# Patient Record
Sex: MALE | Race: WHITE
[De-identification: names, ages, dates, MRNs, and addresses within clinical notes are randomized per-mention and may not be internally consistent; named-entity substitution may affect disease eponyms.]

---

## 2019-09-29 ENCOUNTER — HEALTH MAINTENANCE LETTER (OUTPATIENT)
Age: 64
End: 2019-09-29

## 2020-03-15 ENCOUNTER — HEALTH MAINTENANCE LETTER (OUTPATIENT)
Age: 65
End: 2020-03-15

## 2021-01-14 ENCOUNTER — HEALTH MAINTENANCE LETTER (OUTPATIENT)
Age: 66
End: 2021-01-14

## 2021-04-13 ENCOUNTER — TRANSFERRED RECORDS (OUTPATIENT)
Dept: HEALTH INFORMATION MANAGEMENT | Facility: CLINIC | Age: 66
End: 2021-04-13

## 2021-04-23 ENCOUNTER — TRANSCRIBE ORDERS (OUTPATIENT)
Dept: OTHER | Age: 66
End: 2021-04-23

## 2021-04-23 DIAGNOSIS — D09.9 SQUAMOUS CELL CARCINOMA IN SITU: Primary | ICD-10-CM

## 2021-04-30 NOTE — TELEPHONE ENCOUNTER
FUTURE VISIT INFORMATION      FUTURE VISIT INFORMATION:    Date: 5.3.21    Time: 8:30    Location: [facility]  REFERRAL INFORMATION:    Referring provider:  Dr. Cyndee Caban    Referring providers clinic:  [facility]    Reason for visit/diagnosis  SCC, Scalp     RECORDS REQUESTED FROM:       Clinic name Comments Records Status Imaging Status   [facility] 4.13.21  Dr. Caban  Path # [id number] CE Received  Sent to scanning

## 2021-05-03 ENCOUNTER — PRE VISIT (OUTPATIENT)
Dept: DERMATOLOGY | Facility: CLINIC | Age: 66
End: 2021-05-03

## 2021-05-03 ENCOUNTER — OFFICE VISIT (OUTPATIENT)
Dept: DERMATOLOGY | Facility: CLINIC | Age: 66
End: 2021-05-03
Payer: MEDICARE

## 2021-05-03 VITALS — HEART RATE: 82 BPM | SYSTOLIC BLOOD PRESSURE: 141 MMHG | DIASTOLIC BLOOD PRESSURE: 78 MMHG

## 2021-05-03 DIAGNOSIS — D04.4 SQUAMOUS CELL CARCINOMA IN SITU (SCCIS) OF SCALP: Primary | ICD-10-CM

## 2021-05-03 DIAGNOSIS — D04.39 SQUAMOUS CELL CARCINOMA IN SITU (SCCIS) OF SKIN OF RIGHT TEMPLE REGION: ICD-10-CM

## 2021-05-03 PROCEDURE — 13121 CMPLX RPR S/A/L 2.6-7.5 CM: CPT | Mod: GC | Performed by: DERMATOLOGY

## 2021-05-03 PROCEDURE — 17311 MOHS 1 STAGE H/N/HF/G: CPT | Mod: GC | Performed by: DERMATOLOGY

## 2021-05-03 PROCEDURE — 17312 MOHS ADDL STAGE: CPT | Mod: GC | Performed by: DERMATOLOGY

## 2021-05-03 PROCEDURE — 13122 CMPLX RPR S/A/L ADDL 5 CM/>: CPT | Mod: GC | Performed by: DERMATOLOGY

## 2021-05-03 ASSESSMENT — PAIN SCALES - GENERAL: PAINLEVEL: NO PAIN (0)

## 2021-05-03 NOTE — LETTER
5/3/2021       RE: Robert Abbott  [address]  [address]  [facility] [address]     Dear Colleague,    Thank you for referring your patient, Robert Abbott, to the [facility] DERMATOLOGIC SURGERY [facility] [place] at [facility]. Please see a copy of my visit note below.    [facility] [place] Procedure Note    Date of Service:  May 3, 2021  Surgery: Mohs micrographic surgery    Case 1  Repair Type: Complex linear   Repair Size: 5.2 cm  Suture Material: 3-0 Vicryl, 4-0 Monocryl and 5-0 Fast absorbing gut  Tumor Type: SCCis  Location: Vertex scalp  Derm-Path Accession #: [id number]   PreOp Size: 1.7 x 1.2 cm  PostOp Size: 2.8 x 2.0 cm  Mohs Accession #:   Level of Defect: Fat      Procedure:  We discussed the principles of treatment and most likely complications including scarring, bleeding, infection, swelling, pain, crusting, nerve damage, large wound,  incomplete excision, wound dehiscence,  nerve damage, recurrence, and a second procedure may be recommended to obtain the best cosmetic or functional result.    Informed consent was obtained and the patient underwent the procedure as follows:  The patient was placed supine on the operating table.  The cancer was identified, outlined with a marker, and verified by the patient.  The entire surgical field was prepped with chlorhexidine.  The surgical site was anesthetized using 1% lidocaine with epinephrine.    The area of clinically apparent tumor was debulked. The layer of tissue was then surgically excised using a #15 blade and was then transferred onto a specimen sheet maintaining the orientation of the specimen. Hemostasis was obtained using electrocoagulation. The wound site was then covered with a dressing while the tissue samples were processed for examination.    The excised tissue was transported to the Mohs histology laboratory maintaining the tissue  orientation.  The tissue specimen was relaxed so that the entire surgical margin was in a a single horizontal plane for sectioning and inked for precise mapping.  A precise reference map was drawn to reflect the sectioning of the specimen, colored inking of the margins, and orientation on the patient.  The tissue was processed using horizontal sectioning of the base and continuous peripheral margins.  The histopathologic sections were reviewed in conjunction with the reference map.    Total blocks: 2    Total slides:  4    Residual tumor was identified and indicated in red on the reference map, identifying the location where further tissue excision was necessary. Therefore, an additional stage of Mohs Micrographic surgery was deemed necessary.     Stage II   The patient was returned to the operating room, and the area prepped in the usual manner. The residual tumor was excised using the reference map as a guide. The specimen was transfered to a labeled specimen sheet maintaining the orientation of the specimen. Hemostasis was obtained and the wound site was covered with a dressing while the tissue was processed for examination.     The excised tissue was transported to the Mohs histology laboratory maintaining orientation. The specimen margins were inked for precise mapping and a reference map was prepared for the is additional stage to maintain precise orientation as described above. The tissue was processed using horizontal sectioning of the base and continuous peripheral margins. The histopathologic sections were reviewed in conjunction with the reference map.     Total blocks: 1    Total slides:  2    There were no cancer cells visualized on examination, therefore Mohs surgery was complete.     REPAIR:     A complex layered linear closure was selected as the procedure which would maximally preserve both function and cosmesis and for the following reasons: 1) the defect was widely undermined and 2) given the  wound size, depth, tension, and location.     After the excision of the tumor, the area was extensively and carefully undermined using blunt Metzenbaum scissors. Hemostasis was obtained with spot electrocautery and ligation of vessels where necessary. The subcutaneous and dermal layers were then closed with several 3-0 Vicryl and 4-0 Monocryl sutures. The epidermis was then carefully approximated along the length of the wound using 5-0 Fast absorbing gut simple running sutures.     Estimated blood loss was less than 10 ml for all surgical sites. A sterile pressure dressing was applied and wound care instructions, with a written handout, were given. The patient was discharged from the [facility]atologic Surgery Center alert and ambulatory.    Dr. Zelaya performed the micrographic surgery and reconstruction under the direct supervision of Dr. Larson, who was present for the entire micrographic surgery and key portions of the reconstruction, and always immediately available.         [facility]at[facility] [place] Procedure Note      Date of Service:  May 3, 2021  Surgery: Mohs micrographic surgery    Case 2  Repair Type: complex linear  Repair Size: 5.0 cm  Suture Material: 4-0 Monocryl and 5-0 Fast absorbing gut  Tumor Type: SCCis  Location: Right temple  Derm-Path Accession #:   PreOp Size: 1.5 x 1.3 cm  PostOp Size: 3.2 x 2.4 cm  Mohs Accession #:   Level of Defect: Fat      Procedure:  We discussed the principles of treatment and most likely complications including scarring, bleeding, infection, swelling, pain, crusting, nerve damage, large wound,  incomplete excision, wound dehiscence,  nerve damage, recurrence, and a second procedure may be recommended to obtain the best cosmetic or functional result.    Informed consent was obtained and the patient underwent the procedure as follows:  The patient was placed supine on the operating table.  The cancer was identified, outlined with a  marker, and verified by the patient.  The entire surgical field was prepped with chlorhexidine.  The surgical site was anesthetized using 1% lidocaine with epinephrine.    The area of clinically apparent tumor was debulked. The layer of tissue was then surgically excised using a #15 blade and was then transferred onto a specimen sheet maintaining the orientation of the specimen. Hemostasis was obtained using electrocoagulation. The wound site was then covered with a dressing while the tissue samples were processed for examination.    The excised tissue was transported to the [facility] histology laboratory maintaining the tissue orientation.  The tissue specimen was relaxed so that the entire surgical margin was in a a single horizontal plane for sectioning and inked for precise mapping.  A precise reference map was drawn to reflect the sectioning of the specimen, colored inking of the margins, and orientation on the patient.  The tissue was processed using horizontal sectioning of the base and continuous peripheral margins.  The histopathologic sections were reviewed in conjunction with the reference map.    Total blocks: 2    Total slides:  6    There were no cancer cells visualized on examination, therefore Mohs surgery was complete.    REPAIR:     A complex layered linear closure was selected as the procedure which would maximally preserve both function and cosmesis and for the following reasons: 1) the defect was widely undermined and 2) given the wound size, depth, tension, and location.     After the excision of the tumor, the area was extensively and carefully undermined using blunt Metzenbaum scissors. Hemostasis was obtained with spot electrocautery and ligation of vessels where necessary. The subcutaneous and dermal layers were then closed with 4-0 Monocryl sutures. The epidermis was then carefully approximated along the length of the wound using 5-0 Fast absorbing gut simple running sutures.     Estimated  blood loss was less than 10 ml for all surgical sites. A sterile pressure dressing was applied and wound care instructions, with a written handout, were given. The patient was discharged from the Dermatologic Surgery Center alert and ambulatory.    Dr. Zelaya performed the micrographic surgery and reconstruction under the direct supervision of Dr. Larson, who was present for the entire micrographic surgery and key portions of the reconstruction, and always immediately available.     Ga Zelaya MD  PGY-6    Micrographic Surgery and Dermatologic Oncology Fellow  May 3, 2021          Attestation signed by Kirt Larson MD at 5/12/2021  9:53 AM:    Attending attestation:  I was present for key elements of the procedure and immediately available for all other portions of the procedure.  I have reviewed the note and edited it as necessary.    Kirt Larson M.D.  Professor  Director of Dermatologic Surgery  Department of Dermatology  [facility]    Dermatology Surgery Clinic  [facility]  [address] [place] [id number]

## 2021-05-03 NOTE — PROGRESS NOTES
[facility] Dermatologic Surgery Clinic [place] Procedure Note    Date of Service:  May 3, 2021  Surgery: Mohs micrographic surgery    Case 1  Repair Type: Complex linear   Repair Size: 5.2 cm  Suture Material: 3-0 Vicryl, 4-0 Monocryl and 5-0 Fast absorbing gut  Tumor Type: SCCis  Location: Vertex scalp  Derm-Path Accession #: [id number]   PreOp Size: 1.7 x 1.2 cm  PostOp Size: 2.8 x 2.0 cm  Mohs Accession #:   Level of Defect: Fat      Procedure:  We discussed the principles of treatment and most likely complications including scarring, bleeding, infection, swelling, pain, crusting, nerve damage, large wound,  incomplete excision, wound dehiscence,  nerve damage, recurrence, and a second procedure may be recommended to obtain the best cosmetic or functional result.    Informed consent was obtained and the patient underwent the procedure as follows:  The patient was placed supine on the operating table.  The cancer was identified, outlined with a marker, and verified by the patient.  The entire surgical field was prepped with chlorhexidine.  The surgical site was anesthetized using 1% lidocaine with epinephrine.    The area of clinically apparent tumor was debulked. The layer of tissue was then surgically excised using a #15 blade and was then transferred onto a specimen sheet maintaining the orientation of the specimen. Hemostasis was obtained using electrocoagulation. The wound site was then covered with a dressing while the tissue samples were processed for examination.    The excised tissue was transported to the Mohs histology laboratory maintaining the tissue orientation.  The tissue specimen was relaxed so that the entire surgical margin was in a a single horizontal plane for sectioning and inked for precise mapping.  A precise reference map was drawn to reflect the sectioning of the specimen, colored inking of the margins, and orientation on the patient.  The tissue was processed using  horizontal sectioning of the base and continuous peripheral margins.  The histopathologic sections were reviewed in conjunction with the reference map.    Total blocks: 2    Total slides:  4    Residual tumor was identified and indicated in red on the reference map, identifying the location where further tissue excision was necessary. Therefore, an additional stage of Mohs Micrographic surgery was deemed necessary.     Stage II   The patient was returned to the operating room, and the area prepped in the usual manner. The residual tumor was excised using the reference map as a guide. The specimen was transfered to a labeled specimen sheet maintaining the orientation of the specimen. Hemostasis was obtained and the wound site was covered with a dressing while the tissue was processed for examination.     The excised tissue was transported to the Mohs histology laboratory maintaining orientation. The specimen margins were inked for precise mapping and a reference map was prepared for the is additional stage to maintain precise orientation as described above. The tissue was processed using horizontal sectioning of the base and continuous peripheral margins. The histopathologic sections were reviewed in conjunction with the reference map.     Total blocks: 1    Total slides:  2    There were no cancer cells visualized on examination, therefore Mohs surgery was complete.     REPAIR:     A complex layered linear closure was selected as the procedure which would maximally preserve both function and cosmesis and for the following reasons: 1) the defect was widely undermined and 2) given the wound size, depth, tension, and location.     After the excision of the tumor, the area was extensively and carefully undermined using blunt Metzenbaum scissors. Hemostasis was obtained with spot electrocautery and ligation of vessels where necessary. The subcutaneous and dermal layers were then closed with several 3-0 Vicryl and 4-0  Monocryl sutures. The epidermis was then carefully approximated along the length of the wound using 5-0 Fast absorbing gut simple running sutures.     Estimated blood loss was less than 10 ml for all surgical sites. A sterile pressure dressing was applied and wound care instructions, with a written handout, were given. The patient was discharged from the Dermatologic Surgery Center alert and ambulatory.    Dr. Zelaya performed the micrographic surgery and reconstruction under the direct supervision of Dr. Larson, who was present for the entire micrographic surgery and key portions of the reconstruction, and always immediately available.         [facility] Dermatologic Surgery [facility] [place] Procedure Note      Date of Service:  May 3, 2021  Surgery: Mohs micrographic surgery    Case 2  Repair Type: complex linear  Repair Size: 5.0 cm  Suture Material: 4-0 Monocryl and 5-0 Fast absorbing gut  Tumor Type: SCCis  Location: Right temple  Derm-Path Accession #:   PreOp Size: 1.5 x 1.3 cm  PostOp Size: 3.2 x 2.4 cm  Mohs Accession #:   Level of Defect: Fat      Procedure:  We discussed the principles of treatment and most likely complications including scarring, bleeding, infection, swelling, pain, crusting, nerve damage, large wound,  incomplete excision, wound dehiscence,  nerve damage, recurrence, and a second procedure may be recommended to obtain the best cosmetic or functional result.    Informed consent was obtained and the patient underwent the procedure as follows:  The patient was placed supine on the operating table.  The cancer was identified, outlined with a marker, and verified by the patient.  The entire surgical field was prepped with chlorhexidine.  The surgical site was anesthetized using 1% lidocaine with epinephrine.    The area of clinically apparent tumor was debulked. The layer of tissue was then surgically excised using a #15 blade and was then transferred onto a specimen  sheet maintaining the orientation of the specimen. Hemostasis was obtained using electrocoagulation. The wound site was then covered with a dressing while the tissue samples were processed for examination.    The excised tissue was transported to the Mohs histology laboratory maintaining the tissue orientation.  The tissue specimen was relaxed so that the entire surgical margin was in a a single horizontal plane for sectioning and inked for precise mapping.  A precise reference map was drawn to reflect the sectioning of the specimen, colored inking of the margins, and orientation on the patient.  The tissue was processed using horizontal sectioning of the base and continuous peripheral margins.  The histopathologic sections were reviewed in conjunction with the reference map.    Total blocks: 2    Total slides:  6    There were no cancer cells visualized on examination, therefore Mohs surgery was complete.    REPAIR:     A complex layered linear closure was selected as the procedure which would maximally preserve both function and cosmesis and for the following reasons: 1) the defect was widely undermined and 2) given the wound size, depth, tension, and location.     After the excision of the tumor, the area was extensively and carefully undermined using blunt Metzenbaum scissors. Hemostasis was obtained with spot electrocautery and ligation of vessels where necessary. The subcutaneous and dermal layers were then closed with 4-0 Monocryl sutures. The epidermis was then carefully approximated along the length of the wound using 5-0 Fast absorbing gut simple running sutures.     Estimated blood loss was less than 10 ml for all surgical sites. A sterile pressure dressing was applied and wound care instructions, with a written handout, were given. The patient was discharged from the Dermatologic Surgery Center alert and ambulatory.    Dr. Zelaya performed the micrographic surgery and reconstruction under the direct  supervision of Dr. Larson, who was present for the entire micrographic surgery and key portions of the reconstruction, and always immediately available.     Ga Zelaya MD  PGY-6    Micrographic Surgery and Dermatologic Oncology Fellow  May 3, 2021

## 2021-05-03 NOTE — PATIENT INSTRUCTIONS
Wound Care Instructions  I will experience scar, altered skin color, bleeding, swelling, pain, crusting and redness. I may experience altered sensation. Risks are excessive bleeding, infection, muscle weakness, thick (hypertrophic or keloidal) scar, and recurrence,. A second procedure may be recommended to obtain the best cosmetic or functional result.  Possible complications of any surgical procedure are bleeding, infection, scarring, alteration in skin color and sensation, muscle weakness in the area, wound dehiscence or seperation, or recurrence of the lesion or disease. On occasion, after healing, a secondary procedure or revision may be recommended in order to obtain the best cosmetic or functional result.   After your surgery, a pressure bandage will be placed over the area that has sutures. This will help prevent bleeding.   For the First 48 hours After Surgery:  1. Leave the pressure bandage on and keep it dry. If it should come loose, you may retape it, but do not take it off.  2. Relax and take it easy. Do not do any vigorous exercise, heavy lifting, or bending forward. This could cause the wound to bleed.  3. Post-operative pain is usually mild. You may take plain or extra strength Tylenol every 4 hours as needed (do not take more than 4,000mg in one day). Do not take any medicine that contains aspirin, ibuprofen or motrin unless you have been recommended these by a doctor.  Avoid alcohol and vitamin E as these may increase your tendency to bleed.  4. You may put an ice pack around the bandaged area for 20 minutes every 2-3 hours. This may help reduce swelling, bruising, and pain. Make sure the ice pack is waterproof so that the pressure bandage does not get wet.   5. You may see a small amount of drainage or blood on your pressure bandage. This is normal. However, if drainage or bleeding continues or saturates the bandage, you will need to apply firm pressure over the bandage with a washcloth for 15  minutes. If bleeding continues after applying pressure for 15 minutes then go to the nearest emergency room.  48 Hours After Surgery  Carefully remove the bandage and start daily wound care and dressing changes. You may also now shower and get the wound wet. Wash wound with a mild soap and water.  Use caution when washing the wound. Be gentle and do not let the forceful shower stream hit the wound directly.  PAT dry.  Daily Wound Care:  1. Wash wound with a mild soap and water.  Use caution when washing the wound, be gentle and do not let the forceful shower stream hit the wound directly.  2. PAT DRY.  3. Apply Vaseline (from a new container or tube) over the suture line with a Q-tip. It is very important to keep the wound continuously moist, as wounds heal best in a moist environment.  4.  Keep the site covered until sutures are removed, you can cover it with a Telfa (non-stick) dressing and tape or a band-aid.    5. If you are unable to keep wound covered, you must apply Vaseline every 2 - 3 hours (while awake) to ensure it is being kept moist for optimal healing. A dressing overnight is recommended to keep the area moist.   Call Us If:  1. You have pain that is not controlled with Tylenol.  2. You have signs or symptoms of an infection, such as: fever over 100 degrees F, redness, warmth, or foul-smelling or yellow/creamy drainage from the wound.  Who should I call with questions?    [facility]: [phone]     [facility]: [phone]    For urgent needs outside of business hours call the [facility] at [phone] and ask for the dermatology resident on call

## 2021-05-09 ENCOUNTER — HEALTH MAINTENANCE LETTER (OUTPATIENT)
Age: 66
End: 2021-05-09

## 2021-05-10 ENCOUNTER — VIRTUAL VISIT (OUTPATIENT)
Dept: DERMATOLOGY | Facility: CLINIC | Age: 66
End: 2021-05-10
Payer: MEDICARE

## 2021-05-10 DIAGNOSIS — Z53.9 ERRONEOUS ENCOUNTER--DISREGARD: Primary | ICD-10-CM

## 2021-05-10 ASSESSMENT — PAIN SCALES - GENERAL: PAINLEVEL: NO PAIN (0)

## 2021-05-10 NOTE — LETTER
Date:May 15, 2021      Patient was self referred, no letter generated. Do not send.        [facility] Health Information

## 2021-05-10 NOTE — NURSING NOTE
Dermatology Rooming Note    Robert Abbott's goals for this visit include:   Chief Complaint   Patient presents with     Derm Problem     Squamous cell carcinoma in situ (SCCIS) of scalp - Robert states there has been some swelling with lumps.     Ayesha Paredes, CMA

## 2021-05-10 NOTE — LETTER
5/10/2021       RE: Robert Abbott  [address]  [address] [place] [id number]     Dear Colleague,    Thank you for referring your patient, Robert Abbott, to the [facility] DERMATOLOGIC SURGERY CLINIC [facility]. Please see a copy of my visit note below.      This encounter was opened in error. Please disregard.      Again, thank you for allowing me to participate in the care of your patient.      Sincerely,    Kirt Larson MD

## 2021-10-24 ENCOUNTER — HEALTH MAINTENANCE LETTER (OUTPATIENT)
Age: 66
End: 2021-10-24

## 2021-12-30 ENCOUNTER — APPOINTMENT (OUTPATIENT)
Dept: GENERAL RADIOLOGY | Facility: HOSPITAL | Age: 66
DRG: 418 | End: 2021-12-30
Attending: EMERGENCY MEDICINE
Payer: MEDICARE

## 2021-12-30 ENCOUNTER — HOSPITAL ENCOUNTER (INPATIENT)
Facility: HOSPITAL | Age: 66
LOS: 5 days | Discharge: HOME | DRG: 418 | End: 2022-01-05
Attending: EMERGENCY MEDICINE | Admitting: HOSPITALIST
Payer: MEDICARE

## 2021-12-30 DIAGNOSIS — K85.90 ACUTE PANCREATITIS, UNSPECIFIED COMPLICATION STATUS, UNSPECIFIED PANCREATITIS TYPE: Primary | ICD-10-CM

## 2021-12-30 DIAGNOSIS — K81.0 ACUTE CHOLECYSTITIS: ICD-10-CM

## 2021-12-30 LAB
ALBUMIN SERPL-MCNC: 3.8 G/DL (ref 3.4–5)
ALP LIVER SERPL-CCNC: 158 U/L
ALT SERPL-CCNC: 537 U/L
ANION GAP SERPL CALC-SCNC: 7 MMOL/L (ref 0–18)
AST SERPL-CCNC: 680 U/L (ref 15–37)
BASOPHILS # BLD AUTO: 0.04 X10(3) UL (ref 0–0.2)
BASOPHILS NFR BLD AUTO: 0.3 %
BILIRUB DIRECT SERPL-MCNC: 0.9 MG/DL (ref 0–0.2)
BILIRUB SERPL-MCNC: 1.5 MG/DL (ref 0.1–2)
BUN BLD-MCNC: 27 MG/DL (ref 7–18)
BUN/CREAT SERPL: 23.3 (ref 10–20)
CALCIUM BLD-MCNC: 9.7 MG/DL (ref 8.5–10.1)
CHLORIDE SERPL-SCNC: 105 MMOL/L (ref 98–112)
CO2 SERPL-SCNC: 26 MMOL/L (ref 21–32)
CREAT BLD-MCNC: 1.16 MG/DL
DEPRECATED RDW RBC AUTO: 46.4 FL (ref 35.1–46.3)
EOSINOPHIL # BLD AUTO: 0.07 X10(3) UL (ref 0–0.7)
EOSINOPHIL NFR BLD AUTO: 0.6 %
ERYTHROCYTE [DISTWIDTH] IN BLOOD BY AUTOMATED COUNT: 13 % (ref 11–15)
GLUCOSE BLD-MCNC: 157 MG/DL (ref 70–99)
HCT VFR BLD AUTO: 40.7 %
HGB BLD-MCNC: 13.8 G/DL
IMM GRANULOCYTES # BLD AUTO: 0.04 X10(3) UL (ref 0–1)
IMM GRANULOCYTES NFR BLD: 0.3 %
LIPASE SERPL-CCNC: 2117 U/L (ref 73–393)
LYMPHOCYTES # BLD AUTO: 1.05 X10(3) UL (ref 1–4)
LYMPHOCYTES NFR BLD AUTO: 8.3 %
MCH RBC QN AUTO: 32.6 PG (ref 26–34)
MCHC RBC AUTO-ENTMCNC: 33.9 G/DL (ref 31–37)
MCV RBC AUTO: 96.2 FL
MONOCYTES # BLD AUTO: 0.59 X10(3) UL (ref 0.1–1)
MONOCYTES NFR BLD AUTO: 4.6 %
NEUTROPHILS # BLD AUTO: 10.91 X10 (3) UL (ref 1.5–7.7)
NEUTROPHILS # BLD AUTO: 10.91 X10(3) UL (ref 1.5–7.7)
NEUTROPHILS NFR BLD AUTO: 85.9 %
OSMOLALITY SERPL CALC.SUM OF ELEC: 294 MOSM/KG (ref 275–295)
PLATELET # BLD AUTO: 199 10(3)UL (ref 150–450)
POTASSIUM SERPL-SCNC: 3.3 MMOL/L (ref 3.5–5.1)
PROT SERPL-MCNC: 7.2 G/DL (ref 6.4–8.2)
RBC # BLD AUTO: 4.23 X10(6)UL
SODIUM SERPL-SCNC: 138 MMOL/L (ref 136–145)
TROPONIN I HIGH SENSITIVITY: 6 NG/L
WBC # BLD AUTO: 12.7 X10(3) UL (ref 4–11)

## 2021-12-30 PROCEDURE — 71045 X-RAY EXAM CHEST 1 VIEW: CPT | Performed by: EMERGENCY MEDICINE

## 2021-12-31 ENCOUNTER — APPOINTMENT (OUTPATIENT)
Dept: CT IMAGING | Facility: HOSPITAL | Age: 66
DRG: 418 | End: 2021-12-31
Attending: EMERGENCY MEDICINE
Payer: MEDICARE

## 2021-12-31 ENCOUNTER — APPOINTMENT (OUTPATIENT)
Dept: MRI IMAGING | Facility: HOSPITAL | Age: 66
DRG: 418 | End: 2021-12-31
Attending: HOSPITALIST
Payer: MEDICARE

## 2021-12-31 ENCOUNTER — APPOINTMENT (OUTPATIENT)
Dept: ULTRASOUND IMAGING | Facility: HOSPITAL | Age: 66
DRG: 418 | End: 2021-12-31
Attending: HOSPITALIST
Payer: MEDICARE

## 2021-12-31 ENCOUNTER — APPOINTMENT (OUTPATIENT)
Dept: NUCLEAR MEDICINE | Facility: HOSPITAL | Age: 66
DRG: 418 | End: 2021-12-31
Attending: HOSPITALIST
Payer: MEDICARE

## 2021-12-31 PROBLEM — K85.90 ACUTE PANCREATITIS: Status: ACTIVE | Noted: 2021-12-31

## 2021-12-31 PROBLEM — K85.90 ACUTE PANCREATITIS, UNSPECIFIED COMPLICATION STATUS, UNSPECIFIED PANCREATITIS TYPE: Status: ACTIVE | Noted: 2021-12-31

## 2021-12-31 LAB
ALBUMIN SERPL-MCNC: 3.5 G/DL (ref 3.4–5)
ALBUMIN/GLOB SERPL: 1.3 {RATIO} (ref 1–2)
ALP LIVER SERPL-CCNC: 182 U/L
ALT SERPL-CCNC: 770 U/L
ANION GAP SERPL CALC-SCNC: 4 MMOL/L (ref 0–18)
AST SERPL-CCNC: 710 U/L (ref 15–37)
BASOPHILS # BLD AUTO: 0.03 X10(3) UL (ref 0–0.2)
BASOPHILS NFR BLD AUTO: 0.3 %
BILIRUB SERPL-MCNC: 1.5 MG/DL (ref 0.1–2)
BUN BLD-MCNC: 20 MG/DL (ref 7–18)
BUN/CREAT SERPL: 18 (ref 10–20)
CALCIUM BLD-MCNC: 9.3 MG/DL (ref 8.5–10.1)
CHLORIDE SERPL-SCNC: 107 MMOL/L (ref 98–112)
CO2 SERPL-SCNC: 30 MMOL/L (ref 21–32)
CREAT BLD-MCNC: 1.11 MG/DL
DEPRECATED RDW RBC AUTO: 46.8 FL (ref 35.1–46.3)
EOSINOPHIL # BLD AUTO: 0.07 X10(3) UL (ref 0–0.7)
EOSINOPHIL NFR BLD AUTO: 0.6 %
ERYTHROCYTE [DISTWIDTH] IN BLOOD BY AUTOMATED COUNT: 13.1 % (ref 11–15)
EST. AVERAGE GLUCOSE BLD GHB EST-MCNC: 123 MG/DL (ref 68–126)
GLOBULIN PLAS-MCNC: 2.7 G/DL (ref 2.8–4.4)
GLUCOSE BLD-MCNC: 83 MG/DL (ref 70–99)
GLUCOSE BLDC GLUCOMTR-MCNC: 82 MG/DL (ref 70–99)
GLUCOSE BLDC GLUCOMTR-MCNC: 83 MG/DL (ref 70–99)
GLUCOSE BLDC GLUCOMTR-MCNC: 87 MG/DL (ref 70–99)
HBA1C MFR BLD: 5.9 % (ref ?–5.7)
HCT VFR BLD AUTO: 38.8 %
HGB BLD-MCNC: 13 G/DL
IMM GRANULOCYTES # BLD AUTO: 0.05 X10(3) UL (ref 0–1)
IMM GRANULOCYTES NFR BLD: 0.4 %
INR BLD: 1.08 (ref 0.8–1.2)
LIPASE SERPL-CCNC: 412 U/L (ref 73–393)
LYMPHOCYTES # BLD AUTO: 1.39 X10(3) UL (ref 1–4)
LYMPHOCYTES NFR BLD AUTO: 12 %
MAGNESIUM SERPL-MCNC: 2.2 MG/DL (ref 1.6–2.6)
MCH RBC QN AUTO: 32.9 PG (ref 26–34)
MCHC RBC AUTO-ENTMCNC: 33.5 G/DL (ref 31–37)
MCV RBC AUTO: 98.2 FL
MONOCYTES # BLD AUTO: 0.83 X10(3) UL (ref 0.1–1)
MONOCYTES NFR BLD AUTO: 7.2 %
NEUTROPHILS # BLD AUTO: 9.18 X10 (3) UL (ref 1.5–7.7)
NEUTROPHILS # BLD AUTO: 9.18 X10(3) UL (ref 1.5–7.7)
NEUTROPHILS NFR BLD AUTO: 79.5 %
OSMOLALITY SERPL CALC.SUM OF ELEC: 294 MOSM/KG (ref 275–295)
PHOSPHATE SERPL-MCNC: 3.8 MG/DL (ref 2.5–4.9)
PLATELET # BLD AUTO: 195 10(3)UL (ref 150–450)
POTASSIUM SERPL-SCNC: 4.1 MMOL/L (ref 3.5–5.1)
PROT SERPL-MCNC: 6.2 G/DL (ref 6.4–8.2)
PROTHROMBIN TIME: 14.1 SECONDS (ref 11.6–14.8)
RBC # BLD AUTO: 3.95 X10(6)UL
SARS-COV-2 RNA RESP QL NAA+PROBE: NOT DETECTED
SODIUM SERPL-SCNC: 141 MMOL/L (ref 136–145)
TRIGL SERPL-MCNC: 42 MG/DL (ref 30–149)
WBC # BLD AUTO: 11.6 X10(3) UL (ref 4–11)

## 2021-12-31 PROCEDURE — 76705 ECHO EXAM OF ABDOMEN: CPT | Performed by: HOSPITALIST

## 2021-12-31 PROCEDURE — 76376 3D RENDER W/INTRP POSTPROCES: CPT | Performed by: HOSPITALIST

## 2021-12-31 PROCEDURE — 99223 1ST HOSP IP/OBS HIGH 75: CPT | Performed by: HOSPITALIST

## 2021-12-31 PROCEDURE — 74183 MRI ABD W/O CNTR FLWD CNTR: CPT | Performed by: HOSPITALIST

## 2021-12-31 PROCEDURE — 74177 CT ABD & PELVIS W/CONTRAST: CPT | Performed by: EMERGENCY MEDICINE

## 2021-12-31 PROCEDURE — 78226 HEPATOBILIARY SYSTEM IMAGING: CPT | Performed by: HOSPITALIST

## 2021-12-31 RX ORDER — SODIUM CHLORIDE 9 MG/ML
INJECTION, SOLUTION INTRAVENOUS CONTINUOUS
Status: DISCONTINUED | OUTPATIENT
Start: 2021-12-31 | End: 2022-01-02

## 2021-12-31 RX ORDER — CHOLECALCIFEROL (VITAMIN D3) 125 MCG
5 CAPSULE ORAL NIGHTLY
COMMUNITY

## 2021-12-31 RX ORDER — EZETIMIBE 10 MG/1
10 TABLET ORAL DAILY
COMMUNITY

## 2021-12-31 RX ORDER — HEPARIN SODIUM 5000 [USP'U]/ML
5000 INJECTION, SOLUTION INTRAVENOUS; SUBCUTANEOUS EVERY 12 HOURS SCHEDULED
Status: DISCONTINUED | OUTPATIENT
Start: 2021-12-31 | End: 2022-01-05

## 2021-12-31 RX ORDER — HYDROMORPHONE HYDROCHLORIDE 1 MG/ML
0.3 INJECTION, SOLUTION INTRAMUSCULAR; INTRAVENOUS; SUBCUTANEOUS EVERY 4 HOURS PRN
Status: DISCONTINUED | OUTPATIENT
Start: 2021-12-31 | End: 2022-01-05

## 2021-12-31 RX ORDER — MORPHINE SULFATE 4 MG/ML
4 INJECTION, SOLUTION INTRAMUSCULAR; INTRAVENOUS ONCE
Status: COMPLETED | OUTPATIENT
Start: 2021-12-31 | End: 2021-12-31

## 2021-12-31 RX ORDER — ONDANSETRON 2 MG/ML
4 INJECTION INTRAMUSCULAR; INTRAVENOUS EVERY 6 HOURS PRN
Status: DISCONTINUED | OUTPATIENT
Start: 2021-12-31 | End: 2022-01-05

## 2021-12-31 RX ORDER — HYDROMORPHONE HYDROCHLORIDE 1 MG/ML
0.5 INJECTION, SOLUTION INTRAMUSCULAR; INTRAVENOUS; SUBCUTANEOUS EVERY 4 HOURS PRN
Status: DISCONTINUED | OUTPATIENT
Start: 2021-12-31 | End: 2022-01-05

## 2021-12-31 NOTE — H&P
[address] Patient Status:  Inpatient    1955 MRN [id number]   Location [facility] 5SW/SE Attending Albertine Boxer, [facility] Day # 0 PCP No primary care provider on file.      Date: mouth, throat, and face: negative  Respiratory: negative for cough and dyspnea on exertion  Cardiovascular: negative for chest pain and dyspnea  Gastrointestinal: negative for constipation, diarrhea, melena, nausea and vomiting  Genitourinary:negative for BUN 20 (H) 12/31/2021     12/31/2021    K 4.1 12/31/2021     12/31/2021    CO2 30.0 12/31/2021    GLU 83 12/31/2021    CA 9.3 12/31/2021    ALB 3.5 12/31/2021    ALKPHO 182 (H) 12/31/2021    BILT 1.5 12/31/2021    TP 6.2 (L) 12/31/2021    AS 60 minutes spent on this admission - examining patient, obtaining history, reviewing previous medical records, going over test results/imaging and discussing plan of care. All questions answered.        Urvashi Wang DO  03/39/2117  **Certifica

## 2021-12-31 NOTE — ED PROVIDER NOTES
Patient Seen in: [facility] AND [facility] Emergency Department    History   Patient presents with:  Abdomen/Flank Pain      HPI    58-year-old male presents the ER with complaint of epigastric pain that started approximately 3 PM.  Patient states the pain was a s Tobacco Use      Smoking status: Never Smoker      ROS  All pertinent positives for the review of systems are mentioned in the HPI  All other organ systems are reviewed and are negative. Constitutional and vital signs reviewed.       Social History and F Mental Status: He is alert and oriented to person, place, and time. Deep Tendon Reflexes: Reflexes are normal and symmetric. Psychiatric:         Behavior: Behavior normal.         Thought Content:  Thought content normal.         Judgment: Judgment 196 (*)     Alkaline Phosphatase 276 (*)     Total Protein 5.6 (*)     Albumin 2.9 (*)     Globulin  2.7 (*)     All other components within normal limits   IRON AND TIBC - Abnormal; Notable for the following components:    Transferrin 162 (*)     All othe within normal limits   TROPONIN I HIGH SENSITIVITY - Normal   TRIGLYCERIDES - Normal   MAGNESIUM - Normal   PHOSPHORUS - Normal   PROTHROMBIN TIME (PT) - Normal   NABILA,DIRECT,REFLEX TITER + SPECIFIC ANTIBODIES - Normal   POCT GLUCOSE - Normal   POCT GLUCOSE Abnormality         Status                     ---------                               -----------         ------                     CBC W/ DIFFERENTIAL[[id number]]          Abnormal            Final result                 Please view results extrahepatic biliary ductal dilatation. SPLEEN: No gastric obstruction.  Duodenum is unremarkable. STOMACH: No gross gastric mass, obstruction or focal abnormality.  Duodenum unremarkable.    PANCREAS: No lesion, fluid collection, ductal dilatation, or Subcutaneous Given 1/3/22 0817)   ondansetron (ZOFRAN) injection 4 mg ( Intravenous MAR Unhold 1/2/22 1146)   HYDROmorphone HCl (DILAUDID) 1 MG/ML injection 0.3 mg ( Intravenous MAR Unhold 1/2/22 1146)   HYDROmorphone HCl (DILAUDID) 1 MG/ML injection 0.5 m Record Review: I personally reviewed available prior medical records for any recent pertinent discharge summaries, testing, and procedures and reviewed those reports. Complicating Factors:  The patient already has does not have any pertinent problems on

## 2021-12-31 NOTE — CONSULTS
[facility]    Report of Consultation    Candida Hurley Patient Status:  Inpatient    1955 MRN [id number]   Location [facility] 5SW/SE Attending Ariana Villafana, [address] [facility] Day # 0 PCP No primary care provider on file.      Rickey Tavarez Intravenous, Q8H      ezetimibe 10 MG Oral Tab, Take 10 mg by mouth daily. Melatonin 5 MG Oral Tab, Take 5 tablets by mouth nightly. AmLODIPine Besylate 5 MG Oral Tab, Take 5 mg by mouth daily. ascorbic acid 500 MG Oral Tab, Take 500 mg by mouth daily. free air beneath the hemidiaphragms. A preliminary report was issued by the [address] [place] [address] Radiology teleradiology service. There are no major discrepancies.   Dictated by (CST): Ros Centeno MD on 12/31/2021 at 5:58 AM     Finalized by (CST): True Sarbjit

## 2021-12-31 NOTE — ED QUICK NOTES
Orders for admission, patient is aware of plan and ready to go upstairs. Any questions, please call ED RN Ean Walton  at extension [phone].      Type of COVID test sent: rapid negative  COVID Suspicion level: Low    Titratable drug(s) infusing: NS bolus  Rate:

## 2021-12-31 NOTE — PROGRESS NOTES
General surgery    Chart reviewed.  Full consult dictated    Cholecystitis, poss cbd stones  - agree with HIDA and mrcp  - Npo, iV abx

## 2021-12-31 NOTE — ED INITIAL ASSESSMENT (HPI)
Patient presents to ER room 43,via [facility] for mid abdominal pain since about 3pm today. had 1 episode of vomiting. No diarrhea no fevers.

## 2022-01-01 LAB
ALBUMIN SERPL-MCNC: 2.9 G/DL (ref 3.4–5)
ALP LIVER SERPL-CCNC: 209 U/L
ALT SERPL-CCNC: 435 U/L
ANION GAP SERPL CALC-SCNC: 7 MMOL/L (ref 0–18)
AST SERPL-CCNC: 173 U/L (ref 15–37)
BASOPHILS # BLD AUTO: 0.05 X10(3) UL (ref 0–0.2)
BASOPHILS NFR BLD AUTO: 0.7 %
BILIRUB DIRECT SERPL-MCNC: 0.4 MG/DL (ref 0–0.2)
BILIRUB SERPL-MCNC: 1 MG/DL (ref 0.1–2)
BUN BLD-MCNC: 12 MG/DL (ref 7–18)
BUN/CREAT SERPL: 12.1 (ref 10–20)
CALCIUM BLD-MCNC: 8.3 MG/DL (ref 8.5–10.1)
CHLORIDE SERPL-SCNC: 112 MMOL/L (ref 98–112)
CO2 SERPL-SCNC: 26 MMOL/L (ref 21–32)
CREAT BLD-MCNC: 0.99 MG/DL
DEPRECATED RDW RBC AUTO: 49.5 FL (ref 35.1–46.3)
EOSINOPHIL # BLD AUTO: 0.34 X10(3) UL (ref 0–0.7)
EOSINOPHIL NFR BLD AUTO: 4.5 %
ERYTHROCYTE [DISTWIDTH] IN BLOOD BY AUTOMATED COUNT: 13.4 % (ref 11–15)
GLUCOSE BLD-MCNC: 72 MG/DL (ref 70–99)
GLUCOSE BLDC GLUCOMTR-MCNC: 81 MG/DL (ref 70–99)
GLUCOSE BLDC GLUCOMTR-MCNC: 96 MG/DL (ref 70–99)
HCT VFR BLD AUTO: 36.3 %
HGB BLD-MCNC: 12 G/DL
IMM GRANULOCYTES # BLD AUTO: 0.03 X10(3) UL (ref 0–1)
IMM GRANULOCYTES NFR BLD: 0.4 %
LYMPHOCYTES # BLD AUTO: 1.56 X10(3) UL (ref 1–4)
LYMPHOCYTES NFR BLD AUTO: 20.4 %
MCH RBC QN AUTO: 33 PG (ref 26–34)
MCHC RBC AUTO-ENTMCNC: 33.1 G/DL (ref 31–37)
MCV RBC AUTO: 99.7 FL
MONOCYTES # BLD AUTO: 0.64 X10(3) UL (ref 0.1–1)
MONOCYTES NFR BLD AUTO: 8.4 %
NEUTROPHILS # BLD AUTO: 5.02 X10 (3) UL (ref 1.5–7.7)
NEUTROPHILS # BLD AUTO: 5.02 X10(3) UL (ref 1.5–7.7)
NEUTROPHILS NFR BLD AUTO: 65.6 %
OSMOLALITY SERPL CALC.SUM OF ELEC: 298 MOSM/KG (ref 275–295)
PLATELET # BLD AUTO: 162 10(3)UL (ref 150–450)
POTASSIUM SERPL-SCNC: 3.9 MMOL/L (ref 3.5–5.1)
PROT SERPL-MCNC: 5.6 G/DL (ref 6.4–8.2)
RBC # BLD AUTO: 3.64 X10(6)UL
SODIUM SERPL-SCNC: 145 MMOL/L (ref 136–145)
WBC # BLD AUTO: 7.6 X10(3) UL (ref 4–11)

## 2022-01-01 PROCEDURE — 99233 SBSQ HOSP IP/OBS HIGH 50: CPT | Performed by: HOSPITALIST

## 2022-01-01 PROCEDURE — 99223 1ST HOSP IP/OBS HIGH 75: CPT | Performed by: SURGERY

## 2022-01-01 NOTE — PLAN OF CARE
No acute changes throughout the night. Patient reported some mild pain and midabdominal tenderness right after finishing his clear liquid diet dinner, but reported that it resolved quickly on its own. Safety precautions in place.      Problem: Patient Dang NEEDED:  - Encourage patient to monitor pain and request assistance  - Assess pain using appropriate pain scale  - Administer analgesics based on type and severity of pain and evaluate response  - Implement non-pharmacological measures as appropriate and e

## 2022-01-01 NOTE — PROGRESS NOTES
[facility]    Progress Note    Laura Fletcher Patient Status:  Inpatient    1955 MRN [id number]   Location [facility] 5SW/SE Attending Lise Oneill MD   [facility] Day # 1 PCP No primary care provider on file.      Subjective: (CST): Holli Young MD on 12/31/2021 at 5:59 AM          MRI ABDOMEN&MRCP W/3D (W+W0)(CPT=74183/65217)    Result Date: 12/31/2021  CONCLUSION:  1.  There is moderate localized fluid at the pancreatic tail extending to the fat inferior to the spleen an compression of the mid common bile duct. Neoplasm should be excluded. Recommend ERCP with brushings to further assess. There is no intrahepatic biliary dilatation identified.  5. Multiple bilateral perirenal cysts as discussed without evidence of hydrone discharge. Feeling better today. Plan:  - Advance diet to Full Liquids  - Continue IVFs today  - No ERCP at this time  - CCY prior to discharge per Surgery  - Daily Alma Ragland M.D.   [address] [facility]  Gastroenterology

## 2022-01-01 NOTE — PROGRESS NOTES
General surgery addendum    Discussed with gastroenterology. They will decide on ERCP. Plan laparoscopic cholecystectomy before discharge to prevent recurrence of pancreatitis.

## 2022-01-01 NOTE — PLAN OF CARE
Problem: Patient Centered Care  Goal: Patient preferences are identified and integrated in the patient's plan of care  Description: Interventions:  - What would you like us to know as we care for you?  To go home without abdominal pain   - Provide timely, and social influences on pain and pain management  - Manage/alleviate anxiety  - Utilize distraction and/or relaxation techniques  - Monitor for opioid side effects  - Notify internist if intervention unsuccessful or patient reports new pain  - Anticipate

## 2022-01-01 NOTE — CONSULTS
[facility]    PATIENT'S NAME: Evy Pollack   ATTENDING PHYSICIAN: Starr Huynh MD   CONSULTING PHYSICIAN: Sabrina Mg MD   PATIENT ACCOUNT#:   [id number]    LOCATION:  [address] [place] [address] #:   [id number]       DATE OF BIRTH:  [birth year] retrograde cholangiopancreatography (ERCP). Further recommendations to follow. I thank you for this consultation.     Dictated By Matias Block MD  d: 01/01/2022 10:43:05  t: 01/01/2022 11:26:35  [facility] [id number]  QJ/

## 2022-01-01 NOTE — PROGRESS NOTES
[facility]    Progress Note    Roberto Lentz Patient Status:  Inpatient    1955 MRN [id number]   Location [facility] 5SW/SE Attending Afia Barakat MD   [facility] Day # 1 PCP No primary care provider on file.      Chief Comp (CST): Valerie Mcneil MD on 12/31/2021 at 2:10 PM     Finalized by (CST): Valerie Mcneil MD on 12/31/2021 at 2:11 PM          XR CHEST AP PORTABLE  (CPT=71045)    Result Date: 12/31/2021  CONCLUSION:  1. No acute cardiopulmonary process.  2. No free air beneath 0.7 cm which could be related to surrounding inflammatory reaction although there is no significant injection of pericholecystic fat. I can not exclude acalculous cholecystitis , and correlate clinically and with HIDA scanning.   Moderate abnormal enhancem pancreatitis, unspecified complication status, unspecified pancreatitis type      Plan:     Acute pancreatitis, unspecified complication status, unspecified pancreatitis type  - apprec GI consult   - mrcp and hida ordered - results reviewed and discussed w

## 2022-01-01 NOTE — H&P (VIEW-ONLY)
[facility]    PATIENT'S NAME: Marlon Mendiola   ATTENDING PHYSICIAN: Reinaldo Serna MD   CONSULTING PHYSICIAN: Og Simmons MD   PATIENT ACCOUNT#:   [id number]    LOCATION:  [address] [place] [address] #:   [id number]       DATE OF BIRTH:  [birth year] retrograde cholangiopancreatography (ERCP). Further recommendations to follow. I thank you for this consultation.     Dictated By Elva Moreno MD  d: 01/01/2022 10:43:05  t: 01/01/2022 11:26:35  [facility] [id number]  /

## 2022-01-01 NOTE — PROGRESS NOTES
[place] [facility]    Progress Note    Vinay Yusuf Patient Status:  Inpatient    1955 MRN [id number]   Location [facility] 5SW/SE Attending Shital De MD   [facility] Day # 1 PCP No primary care provider on file.      Assessment a 12/31/2021    MG 2.2 12/31/2021    PHOS 3.8 12/31/2021       NM GB HEPATOBILIARY SCAN  (CPT=78226)    Result Date: 12/31/2021  CONCLUSION: Normal hepatobiliary exam.  Gallbladder is visualized. No cystic duct or common bile duct obstruction.     Dictated b moderate biliary sludge layering within, but no well-defined gallstones.   Abnormal thickening of the gallbladder wall especially anteriorly where it measures up to 4.4 mm, and there is a focal fluid collection within the wall measuring approximately 1.1 x

## 2022-01-01 NOTE — PLAN OF CARE
Patient reported mild abdominal pain this morning after drinking water. This subsided with no medication, just rest. GI and general surgery on consult. Plan is for a lap roc before discharge. Plan of care updated with patient.  Patient tolerating full liq patient to monitor pain and request assistance  - Assess pain using appropriate pain scale  - Administer analgesics based on type and severity of pain and evaluate response  - Implement non-pharmacological measures as appropriate and evaluate response  - C

## 2022-01-02 ENCOUNTER — ANESTHESIA (OUTPATIENT)
Dept: SURGERY | Facility: HOSPITAL | Age: 67
DRG: 418 | End: 2022-01-02
Payer: MEDICARE

## 2022-01-02 ENCOUNTER — ANESTHESIA EVENT (OUTPATIENT)
Dept: SURGERY | Facility: HOSPITAL | Age: 67
DRG: 418 | End: 2022-01-02
Payer: MEDICARE

## 2022-01-02 LAB
ALBUMIN SERPL-MCNC: 2.9 G/DL (ref 3.4–5)
ALBUMIN/GLOB SERPL: 1.1 {RATIO} (ref 1–2)
ALP LIVER SERPL-CCNC: 276 U/L
ALT SERPL-CCNC: 413 U/L
ANION GAP SERPL CALC-SCNC: 5 MMOL/L (ref 0–18)
AST SERPL-CCNC: 196 U/L (ref 15–37)
BASOPHILS # BLD AUTO: 0.04 X10(3) UL (ref 0–0.2)
BASOPHILS NFR BLD AUTO: 0.7 %
BILIRUB SERPL-MCNC: 1 MG/DL (ref 0.1–2)
BUN BLD-MCNC: 10 MG/DL (ref 7–18)
BUN/CREAT SERPL: 10.3 (ref 10–20)
CALCIUM BLD-MCNC: 8.5 MG/DL (ref 8.5–10.1)
CHLORIDE SERPL-SCNC: 114 MMOL/L (ref 98–112)
CO2 SERPL-SCNC: 24 MMOL/L (ref 21–32)
CREAT BLD-MCNC: 0.97 MG/DL
DEPRECATED HBV CORE AB SER IA-ACNC: 1473.7 NG/ML
DEPRECATED RDW RBC AUTO: 49.1 FL (ref 35.1–46.3)
EOSINOPHIL # BLD AUTO: 0.25 X10(3) UL (ref 0–0.7)
EOSINOPHIL NFR BLD AUTO: 4.1 %
ERYTHROCYTE [DISTWIDTH] IN BLOOD BY AUTOMATED COUNT: 13.5 % (ref 11–15)
GLOBULIN PLAS-MCNC: 2.7 G/DL (ref 2.8–4.4)
GLUCOSE BLD-MCNC: 82 MG/DL (ref 70–99)
HAV AB SER QL IA: NONREACTIVE
HBV CORE AB SERPL QL IA: NONREACTIVE
HBV SURFACE AB SER QL: NONREACTIVE
HBV SURFACE AB SERPL IA-ACNC: <3.1 MIU/ML
HBV SURFACE AG SERPL QL IA: NONREACTIVE
HCT VFR BLD AUTO: 36.3 %
HCV AB SERPL QL IA: NONREACTIVE
HGB BLD-MCNC: 12.2 G/DL
IMM GRANULOCYTES # BLD AUTO: 0.03 X10(3) UL (ref 0–1)
IMM GRANULOCYTES NFR BLD: 0.5 %
IRON SATURATION: 28 %
IRON SERPL-MCNC: 67 UG/DL
LYMPHOCYTES # BLD AUTO: 1.4 X10(3) UL (ref 1–4)
LYMPHOCYTES NFR BLD AUTO: 23.1 %
MCH RBC QN AUTO: 33 PG (ref 26–34)
MCHC RBC AUTO-ENTMCNC: 33.6 G/DL (ref 31–37)
MCV RBC AUTO: 98.1 FL
MONOCYTES # BLD AUTO: 0.65 X10(3) UL (ref 0.1–1)
MONOCYTES NFR BLD AUTO: 10.7 %
NEUTROPHILS # BLD AUTO: 3.69 X10 (3) UL (ref 1.5–7.7)
NEUTROPHILS # BLD AUTO: 3.69 X10(3) UL (ref 1.5–7.7)
NEUTROPHILS NFR BLD AUTO: 60.9 %
OSMOLALITY SERPL CALC.SUM OF ELEC: 294 MOSM/KG (ref 275–295)
PLATELET # BLD AUTO: 164 10(3)UL (ref 150–450)
POTASSIUM SERPL-SCNC: 3.7 MMOL/L (ref 3.5–5.1)
PROT SERPL-MCNC: 5.6 G/DL (ref 6.4–8.2)
RBC # BLD AUTO: 3.7 X10(6)UL
SODIUM SERPL-SCNC: 143 MMOL/L (ref 136–145)
TOTAL IRON BINDING CAPACITY: 241 UG/DL (ref 240–450)
TRANSFERRIN SERPL-MCNC: 162 MG/DL (ref 200–360)
WBC # BLD AUTO: 6.1 X10(3) UL (ref 4–11)

## 2022-01-02 PROCEDURE — 0FT44ZZ RESECTION OF GALLBLADDER, PERCUTANEOUS ENDOSCOPIC APPROACH: ICD-10-PCS | Performed by: SURGERY

## 2022-01-02 PROCEDURE — 99233 SBSQ HOSP IP/OBS HIGH 50: CPT | Performed by: HOSPITALIST

## 2022-01-02 PROCEDURE — 47562 LAPAROSCOPIC CHOLECYSTECTOMY: CPT | Performed by: SURGERY

## 2022-01-02 RX ORDER — ROCURONIUM BROMIDE 10 MG/ML
INJECTION, SOLUTION INTRAVENOUS AS NEEDED
Status: DISCONTINUED | OUTPATIENT
Start: 2022-01-02 | End: 2022-01-02 | Stop reason: SURG

## 2022-01-02 RX ORDER — NEOSTIGMINE METHYLSULFATE 1 MG/ML
INJECTION INTRAVENOUS AS NEEDED
Status: DISCONTINUED | OUTPATIENT
Start: 2022-01-02 | End: 2022-01-02 | Stop reason: SURG

## 2022-01-02 RX ORDER — HYDROMORPHONE HYDROCHLORIDE 1 MG/ML
0.4 INJECTION, SOLUTION INTRAMUSCULAR; INTRAVENOUS; SUBCUTANEOUS EVERY 5 MIN PRN
Status: DISCONTINUED | OUTPATIENT
Start: 2022-01-02 | End: 2022-01-02 | Stop reason: HOSPADM

## 2022-01-02 RX ORDER — DEXAMETHASONE SODIUM PHOSPHATE 4 MG/ML
VIAL (ML) INJECTION AS NEEDED
Status: DISCONTINUED | OUTPATIENT
Start: 2022-01-02 | End: 2022-01-02 | Stop reason: SURG

## 2022-01-02 RX ORDER — MORPHINE SULFATE 10 MG/ML
6 INJECTION, SOLUTION INTRAMUSCULAR; INTRAVENOUS EVERY 10 MIN PRN
Status: DISCONTINUED | OUTPATIENT
Start: 2022-01-02 | End: 2022-01-02 | Stop reason: HOSPADM

## 2022-01-02 RX ORDER — HALOPERIDOL 5 MG/ML
0.25 INJECTION INTRAMUSCULAR ONCE AS NEEDED
Status: DISCONTINUED | OUTPATIENT
Start: 2022-01-02 | End: 2022-01-02 | Stop reason: HOSPADM

## 2022-01-02 RX ORDER — ONDANSETRON 2 MG/ML
INJECTION INTRAMUSCULAR; INTRAVENOUS AS NEEDED
Status: DISCONTINUED | OUTPATIENT
Start: 2022-01-02 | End: 2022-01-02 | Stop reason: SURG

## 2022-01-02 RX ORDER — HYDROCODONE BITARTRATE AND ACETAMINOPHEN 5; 325 MG/1; MG/1
2 TABLET ORAL AS NEEDED
Status: DISCONTINUED | OUTPATIENT
Start: 2022-01-02 | End: 2022-01-02 | Stop reason: HOSPADM

## 2022-01-02 RX ORDER — PROCHLORPERAZINE EDISYLATE 5 MG/ML
5 INJECTION INTRAMUSCULAR; INTRAVENOUS ONCE AS NEEDED
Status: DISCONTINUED | OUTPATIENT
Start: 2022-01-02 | End: 2022-01-02 | Stop reason: HOSPADM

## 2022-01-02 RX ORDER — ACETAMINOPHEN 325 MG/1
650 TABLET ORAL EVERY 6 HOURS PRN
Status: DISCONTINUED | OUTPATIENT
Start: 2022-01-02 | End: 2022-01-05

## 2022-01-02 RX ORDER — MORPHINE SULFATE 4 MG/ML
2 INJECTION, SOLUTION INTRAMUSCULAR; INTRAVENOUS EVERY 10 MIN PRN
Status: DISCONTINUED | OUTPATIENT
Start: 2022-01-02 | End: 2022-01-02 | Stop reason: HOSPADM

## 2022-01-02 RX ORDER — MORPHINE SULFATE 4 MG/ML
4 INJECTION, SOLUTION INTRAMUSCULAR; INTRAVENOUS EVERY 10 MIN PRN
Status: DISCONTINUED | OUTPATIENT
Start: 2022-01-02 | End: 2022-01-02 | Stop reason: HOSPADM

## 2022-01-02 RX ORDER — EPHEDRINE SULFATE 50 MG/ML
INJECTION, SOLUTION INTRAVENOUS AS NEEDED
Status: DISCONTINUED | OUTPATIENT
Start: 2022-01-02 | End: 2022-01-02 | Stop reason: SURG

## 2022-01-02 RX ORDER — ONDANSETRON 2 MG/ML
4 INJECTION INTRAMUSCULAR; INTRAVENOUS ONCE AS NEEDED
Status: DISCONTINUED | OUTPATIENT
Start: 2022-01-02 | End: 2022-01-02 | Stop reason: HOSPADM

## 2022-01-02 RX ORDER — HYDROCODONE BITARTRATE AND ACETAMINOPHEN 5; 325 MG/1; MG/1
1 TABLET ORAL EVERY 6 HOURS PRN
Status: DISCONTINUED | OUTPATIENT
Start: 2022-01-02 | End: 2022-01-05

## 2022-01-02 RX ORDER — BUPIVACAINE HYDROCHLORIDE AND EPINEPHRINE 2.5; 5 MG/ML; UG/ML
INJECTION, SOLUTION INFILTRATION; PERINEURAL AS NEEDED
Status: DISCONTINUED | OUTPATIENT
Start: 2022-01-02 | End: 2022-01-02 | Stop reason: HOSPADM

## 2022-01-02 RX ORDER — HYDROMORPHONE HYDROCHLORIDE 1 MG/ML
0.2 INJECTION, SOLUTION INTRAMUSCULAR; INTRAVENOUS; SUBCUTANEOUS EVERY 5 MIN PRN
Status: DISCONTINUED | OUTPATIENT
Start: 2022-01-02 | End: 2022-01-02 | Stop reason: HOSPADM

## 2022-01-02 RX ORDER — SODIUM CHLORIDE, SODIUM LACTATE, POTASSIUM CHLORIDE, CALCIUM CHLORIDE 600; 310; 30; 20 MG/100ML; MG/100ML; MG/100ML; MG/100ML
INJECTION, SOLUTION INTRAVENOUS CONTINUOUS PRN
Status: DISCONTINUED | OUTPATIENT
Start: 2022-01-02 | End: 2022-01-02 | Stop reason: SURG

## 2022-01-02 RX ORDER — NALOXONE HYDROCHLORIDE 0.4 MG/ML
80 INJECTION, SOLUTION INTRAMUSCULAR; INTRAVENOUS; SUBCUTANEOUS AS NEEDED
Status: DISCONTINUED | OUTPATIENT
Start: 2022-01-02 | End: 2022-01-02 | Stop reason: HOSPADM

## 2022-01-02 RX ORDER — HYDROCODONE BITARTRATE AND ACETAMINOPHEN 5; 325 MG/1; MG/1
1 TABLET ORAL AS NEEDED
Status: DISCONTINUED | OUTPATIENT
Start: 2022-01-02 | End: 2022-01-02 | Stop reason: HOSPADM

## 2022-01-02 RX ORDER — MIDAZOLAM HYDROCHLORIDE 1 MG/ML
INJECTION INTRAMUSCULAR; INTRAVENOUS AS NEEDED
Status: DISCONTINUED | OUTPATIENT
Start: 2022-01-02 | End: 2022-01-02 | Stop reason: SURG

## 2022-01-02 RX ORDER — GLYCOPYRROLATE 0.2 MG/ML
INJECTION, SOLUTION INTRAMUSCULAR; INTRAVENOUS AS NEEDED
Status: DISCONTINUED | OUTPATIENT
Start: 2022-01-02 | End: 2022-01-02 | Stop reason: SURG

## 2022-01-02 RX ORDER — HYDROMORPHONE HYDROCHLORIDE 1 MG/ML
0.6 INJECTION, SOLUTION INTRAMUSCULAR; INTRAVENOUS; SUBCUTANEOUS EVERY 5 MIN PRN
Status: DISCONTINUED | OUTPATIENT
Start: 2022-01-02 | End: 2022-01-02 | Stop reason: HOSPADM

## 2022-01-02 RX ORDER — SODIUM CHLORIDE, SODIUM LACTATE, POTASSIUM CHLORIDE, CALCIUM CHLORIDE 600; 310; 30; 20 MG/100ML; MG/100ML; MG/100ML; MG/100ML
INJECTION, SOLUTION INTRAVENOUS CONTINUOUS
Status: DISCONTINUED | OUTPATIENT
Start: 2022-01-02 | End: 2022-01-02 | Stop reason: HOSPADM

## 2022-01-02 RX ADMIN — MIDAZOLAM HYDROCHLORIDE 2 MG: 1 INJECTION INTRAMUSCULAR; INTRAVENOUS at 09:33:00

## 2022-01-02 RX ADMIN — ONDANSETRON 4 MG: 2 INJECTION INTRAMUSCULAR; INTRAVENOUS at 09:40:00

## 2022-01-02 RX ADMIN — SODIUM CHLORIDE, SODIUM LACTATE, POTASSIUM CHLORIDE, CALCIUM CHLORIDE: 600; 310; 30; 20 INJECTION, SOLUTION INTRAVENOUS at 09:12:00

## 2022-01-02 RX ADMIN — NEOSTIGMINE METHYLSULFATE 4 MG: 1 INJECTION INTRAVENOUS at 10:12:00

## 2022-01-02 RX ADMIN — GLYCOPYRROLATE 0.6 MG: 0.2 INJECTION, SOLUTION INTRAMUSCULAR; INTRAVENOUS at 10:12:00

## 2022-01-02 RX ADMIN — EPHEDRINE SULFATE 5 MG: 50 INJECTION, SOLUTION INTRAVENOUS at 09:51:00

## 2022-01-02 RX ADMIN — DEXAMETHASONE SODIUM PHOSPHATE 4 MG: 4 MG/ML VIAL (ML) INJECTION at 09:40:00

## 2022-01-02 RX ADMIN — ROCURONIUM BROMIDE 40 MG: 10 INJECTION, SOLUTION INTRAVENOUS at 09:35:00

## 2022-01-02 NOTE — PLAN OF CARE
Patient went for a lap roc this morning. Informed consent signed and placed in chart. Patient to be transferred to  floor after surgery. Report called and given to NASIR Taylor. Patient belongings were walked to patient's new room 428 by this RN.  I called goal  Description: INTERVENTION AS NEEDED:  - Encourage patient to monitor pain and request assistance  - Assess pain using appropriate pain scale  - Administer analgesics based on type and severity of pain and evaluate response  - Implement non-pharmacolo

## 2022-01-02 NOTE — PROGRESS NOTES
[facility]    Progress Note    Hodandenita Watson Patient Status:  Inpatient    1955 MRN [id number]   Location [facility] 5SW/SE Attending Roz Richardson MD   [facility] Day # 2 PCP No primary care provider on file.      Subjective: although findings could suggest acute pancreatitis of the pancreatic tail. No well-defined mass identified. The remainder of the pancreas is unremarkable. No pancreatic duct dilatation.   Correlate clinically and with pancreatic enzyme levels for acute p 78year old male with no significant past medical history who presents with abdominal pain.     Acute Cholecystitis:  Transaminitis:  - Patient presents with acute onset of abdominal pain, nausea and emesis last night with labs showing elevated LFTs in h

## 2022-01-02 NOTE — OPERATIVE REPORT
[facility]    PATIENT'S NAME: Servando Fletcher   ATTENDING PHYSICIAN: Jazmine Fernandes DO   OPERATING PHYSICIAN: Aden Vinson MD   PATIENT ACCOUNT#:   [de-identified]    LOCATION:  [address] [place] [address] #:   [id number]       DATE OF BIRTH:  0

## 2022-01-02 NOTE — PLAN OF CARE
Pt is A&Ox4, RA, Pt is up with stand by and voiding freely. Pt has denied pain so far. Pt also would like to take tylenol instead of norco to start with to see if the that helps.   This nurse was able to get the tylenol added to patient MAR for when he ne fluids  - tolerate diet  - manage labs  Outcome: Progressing     Problem: Pain  Goal: Verbalizes/displays adequate comfort level or patient's stated pain goal  Description: INTERVENTION AS NEEDED:  - Encourage patient to monitor pain and request assistance for changes in respiratory status  - Assess for changes in mentation and behavior  - Position to facilitate oxygenation and minimize respiratory effort  - Oxygen supplementation based on oxygen saturation or ABGs  - Provide Smoking Cessation handout, if ap

## 2022-01-02 NOTE — ANESTHESIA PROCEDURE NOTES
Airway  Date/Time: 1/2/2022 9:38 AM  Urgency: elective    Airway not difficult    General Information and Staff    Patient location during procedure: OR  Anesthesiologist: Dex Willams MD  Performed: anesthesiologist     Indications and Patient Condition

## 2022-01-02 NOTE — BRIEF OP NOTE
Pre-Operative Diagnosis: Gall stone pancreatitis     Post-Operative Diagnosis: Gallstone pancreatitis, acute cholecystitis, steatohepatitis     Procedure Performed:   LAPAROSCOPIC CHOLECYSTECTOMY    Surgeon(s) and Role:     Diane Mojica MD - Primary

## 2022-01-02 NOTE — ANESTHESIA PREPROCEDURE EVALUATION
Anesthesia PreOp Note    HPI:     Peri Paiz is a 78year old male who presents for preoperative consultation requested by: Dejuan Gutiérrez MD    Date of Surgery: 12/30/2021 - 1/2/2022    Procedure(s):  LAPAROSCOPIC POSSIBLE OPEN  CHOLECYSTECTOMY  Lisa UNIT/ML injection 5,000 Units, 5,000 Units, Subcutaneous, 2 times per day, Floridalma Gracia MD, 5,000 Units at 01/01/22 2042  [facility] Hold] ondansetron (ZOFRAN) injection 4 mg, 4 mg, Intravenous, Q6H PRN, Floridalma Gracia MD  [facility] Hold] HYDROmorphon file    Available pre-op labs reviewed.   Lab Results   Component Value Date    WBC 6.1 01/02/2022    RBC 3.70 (L) 01/02/2022    HGB 12.2 (L) 01/02/2022    HCT 36.3 (L) 01/02/2022    MCV 98.1 01/02/2022    MCH 33.0 01/02/2022    MCHC 33.6 01/02/2022    RDW positioning by pt himself   ETT/PONV/dental damages etc      I have informed Jennifer Denise and/or legal guardian or family member of the nature of the anesthetic plan, benefits, risks including possible dental damage if relevant, major complications, and

## 2022-01-02 NOTE — INTERVAL H&P NOTE
Pre-op Diagnosis: Acute cholecystitis [K81.0]    The above referenced H&P was reviewed by Og Simmons MD on 1/2/2022, the patient was examined and no significant changes have occurred in the patient's condition since the H&P was performed.   I discussed w

## 2022-01-02 NOTE — PROGRESS NOTES
[facility]    Progress Note    Yan An Patient Status:  Inpatient    1955 MRN [id number]   Location [facility] 4W/SW/SE Attending Ely Ovalle, [address] [facility] Day # 2 PCP No primary care provider on file.        Subject 12/31/2021    MG 2.2 12/31/2021    PHOS 3.8 12/31/2021       No results found.         Results:     CBC:    Lab Results   Component Value Date    WBC 6.1 01/02/2022    WBC 7.6 01/01/2022    WBC 11.6 (H) 12/31/2021     Lab Results   Component Value Date    H

## 2022-01-02 NOTE — ANESTHESIA POSTPROCEDURE EVALUATION
Patient: Laura Fletcher    Procedure Summary     Date: 01/02/22 Room / Location: [address] [place] [address] MAIN OR 05 / [address] [place] [address] MAIN OR    Anesthesia Start: 9232 Anesthesia Stop:     Procedure: LAPAROSCOPIC CHOLECYSTECTOMY (N/A Abdomen) Diagnosis:       Acute cholecystitis      (A

## 2022-01-03 LAB
ALBUMIN SERPL-MCNC: 3.2 G/DL (ref 3.4–5)
ALP LIVER SERPL-CCNC: 329 U/L
ALT SERPL-CCNC: 467 U/L
ANION GAP SERPL CALC-SCNC: 8 MMOL/L (ref 0–18)
AST SERPL-CCNC: 228 U/L (ref 15–37)
BASOPHILS # BLD AUTO: 0.03 X10(3) UL (ref 0–0.2)
BASOPHILS NFR BLD AUTO: 0.2 %
BILIRUB DIRECT SERPL-MCNC: 0.4 MG/DL (ref 0–0.2)
BILIRUB SERPL-MCNC: 0.9 MG/DL (ref 0.1–2)
BUN BLD-MCNC: 11 MG/DL (ref 7–18)
BUN/CREAT SERPL: 8.6 (ref 10–20)
CALCIUM BLD-MCNC: 8.9 MG/DL (ref 8.5–10.1)
CHLORIDE SERPL-SCNC: 108 MMOL/L (ref 98–112)
CO2 SERPL-SCNC: 24 MMOL/L (ref 21–32)
CREAT BLD-MCNC: 1.28 MG/DL
DEPRECATED RDW RBC AUTO: 48.4 FL (ref 35.1–46.3)
EOSINOPHIL # BLD AUTO: 0.03 X10(3) UL (ref 0–0.7)
EOSINOPHIL NFR BLD AUTO: 0.2 %
ERYTHROCYTE [DISTWIDTH] IN BLOOD BY AUTOMATED COUNT: 13.4 % (ref 11–15)
GLUCOSE BLD-MCNC: 169 MG/DL (ref 70–99)
HCT VFR BLD AUTO: 39.1 %
HGB BLD-MCNC: 13.1 G/DL
IMM GRANULOCYTES # BLD AUTO: 0.03 X10(3) UL (ref 0–1)
IMM GRANULOCYTES NFR BLD: 0.2 %
LYMPHOCYTES # BLD AUTO: 1.58 X10(3) UL (ref 1–4)
LYMPHOCYTES NFR BLD AUTO: 12.2 %
MCH RBC QN AUTO: 33 PG (ref 26–34)
MCHC RBC AUTO-ENTMCNC: 33.5 G/DL (ref 31–37)
MCV RBC AUTO: 98.5 FL
MONOCYTES # BLD AUTO: 0.75 X10(3) UL (ref 0.1–1)
MONOCYTES NFR BLD AUTO: 5.8 %
NEUTROPHILS # BLD AUTO: 10.58 X10 (3) UL (ref 1.5–7.7)
NEUTROPHILS # BLD AUTO: 10.58 X10(3) UL (ref 1.5–7.7)
NEUTROPHILS NFR BLD AUTO: 81.4 %
NUCLEAR IGG TITR SER IF: NEGATIVE {TITER}
OSMOLALITY SERPL CALC.SUM OF ELEC: 293 MOSM/KG (ref 275–295)
PLATELET # BLD AUTO: 194 10(3)UL (ref 150–450)
POTASSIUM SERPL-SCNC: 3.8 MMOL/L (ref 3.5–5.1)
PROT SERPL-MCNC: 6 G/DL (ref 6.4–8.2)
RBC # BLD AUTO: 3.97 X10(6)UL
SODIUM SERPL-SCNC: 140 MMOL/L (ref 136–145)
WBC # BLD AUTO: 13 X10(3) UL (ref 4–11)

## 2022-01-03 PROCEDURE — 99233 SBSQ HOSP IP/OBS HIGH 50: CPT | Performed by: HOSPITALIST

## 2022-01-03 RX ORDER — DICLOFENAC EPOLAMINE 180 MG/1
1 PATCH TOPICAL EVERY 12 HOURS
Status: DISCONTINUED | OUTPATIENT
Start: 2022-01-03 | End: 2022-01-05

## 2022-01-03 RX ORDER — DOCUSATE SODIUM 100 MG/1
100 CAPSULE, LIQUID FILLED ORAL 2 TIMES DAILY
Status: DISCONTINUED | OUTPATIENT
Start: 2022-01-03 | End: 2022-01-05

## 2022-01-03 RX ORDER — POLYETHYLENE GLYCOL 3350 17 G/17G
17 POWDER, FOR SOLUTION ORAL DAILY
Status: DISCONTINUED | OUTPATIENT
Start: 2022-01-03 | End: 2022-01-05

## 2022-01-03 RX ORDER — KETOROLAC TROMETHAMINE 30 MG/ML
30 INJECTION, SOLUTION INTRAMUSCULAR; INTRAVENOUS EVERY 6 HOURS PRN
Status: DISPENSED | OUTPATIENT
Start: 2022-01-03 | End: 2022-01-05

## 2022-01-03 NOTE — PROGRESS NOTES
[facility]  GI Progress Note      Delio Jeong Patient Status:  Inpatient    1955 MRN [id number]   Location [facility] 4W/SW/SE Attending Jacqui Poon, [address] [facility] Day # 3 PCP No primary care provider on file.           SUBJECTIVE

## 2022-01-03 NOTE — DISCHARGE SUMMARY
[facility] Hospitalist Discharge Summary   Patient ID:  Rogelio Holt  [id number]  78year old  7/12/1955  Admit date: 12/30/2021  Discharge date: 1/4/2022  Primary Care Physician: No primary care provider on file.    Attending Physician: Serena Roca, distress. Skin: Warm hydrated  Psych: Calm and cooperative  Respiratory: Clear to auscultation bilaterally. No wheezes. No rhonchi. Cardiovascular: S1, S2. Regular rate and rhythm. No murmurs, rubs or gallops. Abdomen: Soft, nontender, nondistended. pancreatitis of the pancreatic tail as mentioned above. 3. Abnormal appearance to a distended gallbladder with moderate biliary sludge layering within, but no well-defined gallstones.   Abnormal thickening of the gallbladder wall especially anteriorly where docusate sodium 100 MG Caps  Commonly known as: COLACE      Take 100 mg by mouth 2 (two) times daily. Refills: 0     Polyethylene Glycol 3350 17 g Pack  Commonly known as: MIRALAX  Start taking on: January 5, 2022      Take 17 g by mouth daily.    Refil Recommendation:  LACE < 29: Low Risk of readmission after discharge from the hospital; Still recommend for TCM follow-up.   Total Time Coordinating Care: Greater than 30 minutes  Patient had opportunity to ask questions, state understanding, and agree with

## 2022-01-03 NOTE — PLAN OF CARE
Patient is alert & oriented x4. On room air, CPAP at night. Tele in place, runs sinus lew. IV Zosyn. Pain being managed with tylenol prn. Advanced to low fiber soft in AM. Voiding WNL. Passing some gas. Abdominal surgical dressing dry & intact.  Brace to Verbalizes/displays adequate comfort level or patient's stated pain goal  Description: INTERVENTION AS NEEDED:  - Encourage patient to monitor pain and request assistance  - Assess pain using appropriate pain scale  - Administer analgesics based on type an Position to facilitate oxygenation and minimize respiratory effort  - Oxygen supplementation based on oxygen saturation or ABGs  - Provide Smoking Cessation handout, if applicable  - Encourage broncho-pulmonary hygiene including cough, deep breathe, Incent

## 2022-01-03 NOTE — PROGRESS NOTES
[place] [facility] - [facility]    Progress Note    Rakesh Mukherjee Patient Status:  Inpatient    1955 MRN [id number]   Location [facility] 4W/SW/SE Attending Namita Payan, [address] [facility] Day # 3 PCP No primary care provider on file.      Assessmen  01/03/2022    CO2 24.0 01/03/2022     (H) 01/03/2022    CA 8.9 01/03/2022    ALB 2.9 (L) 01/02/2022    ALKPHO 276 (H) 01/02/2022    BILT 1.0 01/02/2022    TP 5.6 (L) 01/02/2022     (H) 01/02/2022     (H) 01/02/2022    INR 1.0

## 2022-01-03 NOTE — PROGRESS NOTES
[place] [facility] - [facility]     Hospitalist Progress Note     Staffordedmundo Ericksonn Patient Status:  Inpatient    1955 MRN [id number]   Location [facility] 4W/SW/SE Attending Tanner Wolff, [address] [facility] Day # 3 PCP No primary care provider on file. 01/03/22  0732   GLU 83   < > 72 82 169*   BUN 20*   < > 12 10 11   CREATSERUM 1.11   < > 0.99 0.97 1.28   GFRAA 79   < > 91 93 67   GFRNAA 68   < > 78 80 58*   CA 9.3   < > 8.3* 8.5 8.9   ALB 3.5  --  2.9* 2.9*  --       < > 145 143 140   K 4.1   < Willa Matter is expected to be discharge to: home, lives alone, when cleared by surgery. Greater than 35 minutes spent, >50% spent counseling re: treatment plan and workup    Plan of care discussed with patient or family at bedside. Diana Major

## 2022-01-04 ENCOUNTER — ANESTHESIA EVENT (OUTPATIENT)
Dept: ENDOSCOPY | Facility: HOSPITAL | Age: 67
DRG: 418 | End: 2022-01-04
Payer: MEDICARE

## 2022-01-04 ENCOUNTER — ANESTHESIA (OUTPATIENT)
Dept: ENDOSCOPY | Facility: HOSPITAL | Age: 67
DRG: 418 | End: 2022-01-04
Payer: MEDICARE

## 2022-01-04 LAB
ALBUMIN SERPL-MCNC: 2.9 G/DL (ref 3.4–5)
ALP LIVER SERPL-CCNC: 264 U/L
ALT SERPL-CCNC: 302 U/L
ANION GAP SERPL CALC-SCNC: 6 MMOL/L (ref 0–18)
AST SERPL-CCNC: 71 U/L (ref 15–37)
BASOPHILS # BLD AUTO: 0.07 X10(3) UL (ref 0–0.2)
BASOPHILS NFR BLD AUTO: 0.8 %
BILIRUB DIRECT SERPL-MCNC: 0.3 MG/DL (ref 0–0.2)
BILIRUB SERPL-MCNC: 0.9 MG/DL (ref 0.1–2)
BUN BLD-MCNC: 16 MG/DL (ref 7–18)
BUN/CREAT SERPL: 14.5 (ref 10–20)
CALCIUM BLD-MCNC: 8.6 MG/DL (ref 8.5–10.1)
CHLORIDE SERPL-SCNC: 109 MMOL/L (ref 98–112)
CO2 SERPL-SCNC: 26 MMOL/L (ref 21–32)
CREAT BLD-MCNC: 1.1 MG/DL
DEPRECATED RDW RBC AUTO: 49.1 FL (ref 35.1–46.3)
EOSINOPHIL # BLD AUTO: 0.39 X10(3) UL (ref 0–0.7)
EOSINOPHIL NFR BLD AUTO: 4.7 %
ERYTHROCYTE [DISTWIDTH] IN BLOOD BY AUTOMATED COUNT: 13.5 % (ref 11–15)
GLUCOSE BLD-MCNC: 83 MG/DL (ref 70–99)
HCT VFR BLD AUTO: 36.8 %
HGB BLD-MCNC: 12.3 G/DL
IMM GRANULOCYTES # BLD AUTO: 0.06 X10(3) UL (ref 0–1)
IMM GRANULOCYTES NFR BLD: 0.7 %
LYMPHOCYTES # BLD AUTO: 1.85 X10(3) UL (ref 1–4)
LYMPHOCYTES NFR BLD AUTO: 22.2 %
MCH RBC QN AUTO: 33 PG (ref 26–34)
MCHC RBC AUTO-ENTMCNC: 33.4 G/DL (ref 31–37)
MCV RBC AUTO: 98.7 FL
MONOCYTES # BLD AUTO: 0.73 X10(3) UL (ref 0.1–1)
MONOCYTES NFR BLD AUTO: 8.8 %
NEUTROPHILS # BLD AUTO: 5.22 X10 (3) UL (ref 1.5–7.7)
NEUTROPHILS # BLD AUTO: 5.22 X10(3) UL (ref 1.5–7.7)
NEUTROPHILS NFR BLD AUTO: 62.8 %
OSMOLALITY SERPL CALC.SUM OF ELEC: 292 MOSM/KG (ref 275–295)
PLATELET # BLD AUTO: 175 10(3)UL (ref 150–450)
POTASSIUM SERPL-SCNC: 3.9 MMOL/L (ref 3.5–5.1)
PROT SERPL-MCNC: 5.8 G/DL (ref 6.4–8.2)
RBC # BLD AUTO: 3.73 X10(6)UL
SARS-COV-2 RNA RESP QL NAA+PROBE: NOT DETECTED
SMOOTH MUSCLE AB TITR SER: <20 {TITER}
SODIUM SERPL-SCNC: 141 MMOL/L (ref 136–145)
WBC # BLD AUTO: 8.3 X10(3) UL (ref 4–11)

## 2022-01-04 PROCEDURE — 0DJ08ZZ INSPECTION OF UPPER INTESTINAL TRACT, VIA NATURAL OR ARTIFICIAL OPENING ENDOSCOPIC: ICD-10-PCS | Performed by: INTERNAL MEDICINE

## 2022-01-04 PROCEDURE — BD47ZZZ ULTRASONOGRAPHY OF GASTROINTESTINAL TRACT: ICD-10-PCS | Performed by: INTERNAL MEDICINE

## 2022-01-04 PROCEDURE — 99233 SBSQ HOSP IP/OBS HIGH 50: CPT | Performed by: HOSPITALIST

## 2022-01-04 RX ORDER — LIDOCAINE HYDROCHLORIDE 10 MG/ML
INJECTION, SOLUTION EPIDURAL; INFILTRATION; INTRACAUDAL; PERINEURAL AS NEEDED
Status: DISCONTINUED | OUTPATIENT
Start: 2022-01-04 | End: 2022-01-04 | Stop reason: SURG

## 2022-01-04 RX ORDER — LIDOCAINE HYDROCHLORIDE 40 MG/ML
SOLUTION TOPICAL AS NEEDED
Status: DISCONTINUED | OUTPATIENT
Start: 2022-01-04 | End: 2022-01-04 | Stop reason: SURG

## 2022-01-04 RX ORDER — DICLOFENAC EPOLAMINE 180 MG/1
1 PATCH TOPICAL EVERY 12 HOURS
Qty: 10 PATCH | Refills: 0 | Status: SHIPPED | OUTPATIENT
Start: 2022-01-04

## 2022-01-04 RX ORDER — POLYETHYLENE GLYCOL 3350 17 G/17G
17 POWDER, FOR SOLUTION ORAL DAILY
Refills: 0 | Status: SHIPPED | COMMUNITY
Start: 2022-01-05

## 2022-01-04 RX ORDER — ONDANSETRON 2 MG/ML
INJECTION INTRAMUSCULAR; INTRAVENOUS AS NEEDED
Status: DISCONTINUED | OUTPATIENT
Start: 2022-01-04 | End: 2022-01-04 | Stop reason: SURG

## 2022-01-04 RX ORDER — GLYCOPYRROLATE 0.2 MG/ML
INJECTION, SOLUTION INTRAMUSCULAR; INTRAVENOUS AS NEEDED
Status: DISCONTINUED | OUTPATIENT
Start: 2022-01-04 | End: 2022-01-04 | Stop reason: SURG

## 2022-01-04 RX ORDER — SODIUM CHLORIDE, SODIUM LACTATE, POTASSIUM CHLORIDE, CALCIUM CHLORIDE 600; 310; 30; 20 MG/100ML; MG/100ML; MG/100ML; MG/100ML
INJECTION, SOLUTION INTRAVENOUS CONTINUOUS
Status: CANCELLED | OUTPATIENT
Start: 2022-01-04

## 2022-01-04 RX ORDER — DEXAMETHASONE SODIUM PHOSPHATE 4 MG/ML
VIAL (ML) INJECTION AS NEEDED
Status: DISCONTINUED | OUTPATIENT
Start: 2022-01-04 | End: 2022-01-04 | Stop reason: SURG

## 2022-01-04 RX ORDER — PSEUDOEPHEDRINE HCL 30 MG
100 TABLET ORAL 2 TIMES DAILY
Refills: 0 | Status: SHIPPED | COMMUNITY
Start: 2022-01-04

## 2022-01-04 RX ORDER — NALOXONE HYDROCHLORIDE 0.4 MG/ML
80 INJECTION, SOLUTION INTRAMUSCULAR; INTRAVENOUS; SUBCUTANEOUS AS NEEDED
Status: CANCELLED | OUTPATIENT
Start: 2022-01-04 | End: 2022-01-05

## 2022-01-04 RX ORDER — SODIUM CHLORIDE, SODIUM LACTATE, POTASSIUM CHLORIDE, CALCIUM CHLORIDE 600; 310; 30; 20 MG/100ML; MG/100ML; MG/100ML; MG/100ML
INJECTION, SOLUTION INTRAVENOUS CONTINUOUS PRN
Status: DISCONTINUED | OUTPATIENT
Start: 2022-01-04 | End: 2022-01-04 | Stop reason: SURG

## 2022-01-04 RX ADMIN — LIDOCAINE HYDROCHLORIDE 50 MG: 10 INJECTION, SOLUTION EPIDURAL; INFILTRATION; INTRACAUDAL; PERINEURAL at 15:55:00

## 2022-01-04 RX ADMIN — ONDANSETRON 4 MG: 2 INJECTION INTRAMUSCULAR; INTRAVENOUS at 15:58:00

## 2022-01-04 RX ADMIN — SODIUM CHLORIDE, SODIUM LACTATE, POTASSIUM CHLORIDE, CALCIUM CHLORIDE: 600; 310; 30; 20 INJECTION, SOLUTION INTRAVENOUS at 15:55:00

## 2022-01-04 RX ADMIN — DEXAMETHASONE SODIUM PHOSPHATE 8 MG: 4 MG/ML VIAL (ML) INJECTION at 15:58:00

## 2022-01-04 RX ADMIN — LIDOCAINE HYDROCHLORIDE 4 ML: 40 SOLUTION TOPICAL at 15:58:00

## 2022-01-04 RX ADMIN — GLYCOPYRROLATE 0.15 MG: 0.2 INJECTION, SOLUTION INTRAMUSCULAR; INTRAVENOUS at 15:58:00

## 2022-01-04 NOTE — ANESTHESIA PROCEDURE NOTES
Airway  Date/Time: 1/4/2022 4:00 PM  Urgency: Elective      General Information and Staff    Patient location during procedure: OR  Anesthesiologist: Boogie Mack MD  Performed: anesthesiologist     Indications and Patient Condition  Indications for ai

## 2022-01-04 NOTE — ANESTHESIA POSTPROCEDURE EVALUATION
Patient: Lily Nhi    Procedure Summary     Date: 01/04/22 Room / Location: [address] [place] [address] ENDOSCOPY 01 / [address] [facility] ENDOSCOPY    Anesthesia Start: 1307 Anesthesia Stop: 9924    Procedure: ENDOSCOPIC ULTRASOUND (EUS) (N/A ) Diagnosis: (normal bile duct)    Surgeons: JOSE RAMON

## 2022-01-04 NOTE — ANESTHESIA PREPROCEDURE EVALUATION
Anesthesia PreOp Note    HPI:     Teodora Severin is a 78year old male who presents for preoperative consultation requested by: Cole Silveira MD    Date of Surgery: 12/30/2021 - 1/4/2022    Procedure(s):  ENDOSCOPIC ULTRASOUND (EUS)  ENDOSCOPIC RETROGR MULTIVITAMIN) Oral Tab, Take 1 tablet by mouth daily. , Disp: , Rfl: , 12/30/2021 at Unknown time  Triamcinolone Acetonide 55 MCG/ACT Nasal Aerosol, 1 spray by Nasal route nightly., Disp: , Rfl: , 12/30/2021 at Unknown time      docusate sodium (COLACE) cap HALLUCINATION  Adhesive Tape           ITCHING    History reviewed. No pertinent family history. Social History    Socioeconomic History      Marital status:        Spouse name: Not on file      Number of children: Not on file      Years of pressure is 156/67 and his pulse is 52. His respiration is 16 and oxygen saturation is 98%.     01/04/22  0529 01/04/22  0807 01/04/22  1209 01/04/22  1517   BP: 115/71 103/76 125/74 156/67   Pulse: 60 52 51 52   Resp: 16 16 16 16   Temp: 98.3 °F (36.8 °C)

## 2022-01-04 NOTE — PLAN OF CARE
Patient has been ambulating independently. Patient complained of chest/shoulder pain this AM. Per pt he is not short of breath, feels like it is his shoulder pain. MD aware.  IV Toradol ordered for pt and states pain has improved, diclofenac patch applied t Short Term Goal  Description: Patient's Short Term Goal: to eat    Interventions:   - hydrate via iv fluids  - tolerate diet  - manage labs  Outcome: Progressing     Problem: Pain  Goal: Verbalizes/displays adequate comfort level or patient's stated pain g - ADULT  Goal: Achieves optimal ventilation and oxygenation  Description: INTERVENTIONS:  - Assess for changes in respiratory status  - Assess for changes in mentation and behavior  - Position to facilitate oxygenation and minimize respiratory effort  - Ox

## 2022-01-04 NOTE — PROGRESS NOTES
[facility] AND [facility]  GI Progress Note      Erick Dominguez Patient Status:  Inpatient    1955 MRN [id number]   Location [facility] 4W/SW/SE Attending Aditya Knapp, [address] [facility] Day # 4 PCP No primary care provider on file.           SUBJECTIVE

## 2022-01-04 NOTE — PROGRESS NOTES
[place] [facility] - [facility]     Hospitalist Progress Note     Roberto Barrazag Patient Status:  Inpatient    1955 MRN [id number]   Location [facility] 4W/SW/SE Attending Nola Whitney, 1604 [facility] Day # 4 PCP No primary care provider on file. 169* 83   BUN 10 11 16   CREATSERUM 0.97 1.28 1.10   GFRAA 93 67 80   GFRNAA 80 58* 69   CA 8.5 8.9 8.6   ALB 2.9* 3.2* 2.9*    140 141   K 3.7 3.8 3.9   * 108 109   CO2 24.0 24.0 26.0   ALKPHO 276* 329* 264*   * 228* 71*   * 467* HL   -  zetia        Supplementary Documentation:     Quality:  · Diet: NPO  · PT/OT: outpatient for shoulder  · DVT Prophylaxis: Heparin  · CODE status: Full  Dispo: NPO/EUS later today with Dr. Bob Headley [facility] Discharge    Greater than 45 minutes spent, >5

## 2022-01-04 NOTE — OPERATIVE REPORT
OPERATIVE REPORT   PATIENT NAME: Emma Harrison  MRN: [id number]  DATE OF OPERATION:   1/4/2022  PREOPERATIVE DIAGNOSIS:   1. Gallstone pancreatitis, status post cholecystectomy, no intraoperative cholangiogram performed.   MRI MRCP was performed with que entering the duodenum at the level of the major papilla ruling out the presence of pancreas divisum by endoscopic ultrasound criteria. The scope at this point was removed.   The gastric content were suctioned at the beginning and at the end of the procedur

## 2022-01-04 NOTE — PROGRESS NOTES
Surgery progress note      LFTs improved this morning.   DC home per surgery as outlined follow-up 2 weeks

## 2022-01-04 NOTE — PLAN OF CARE
Patient has been ambulating independently. Patient states pain is better today. Dressings to abdomen clean and intact. Has sleeve in place to right shoulder. Is on tele, sinus MD lew aware. Has been voiding and was able to have a bowel movement today.  I manage labs  Outcome: Progressing     Problem: Pain  Goal: Verbalizes/displays adequate comfort level or patient's stated pain goal  Description: INTERVENTION AS NEEDED:  - Encourage patient to monitor pain and request assistance  - Assess pain using appro status  - Assess for changes in mentation and behavior  - Position to facilitate oxygenation and minimize respiratory effort  - Oxygen supplementation based on oxygen saturation or ABGs  - Provide Smoking Cessation handout, if applicable  - Encourage bron

## 2022-01-05 LAB
ALBUMIN SERPL-MCNC: 3.1 G/DL (ref 3.4–5)
ALBUMIN/GLOB SERPL: 1 {RATIO} (ref 1–2)
ALP LIVER SERPL-CCNC: 226 U/L
ALT SERPL-CCNC: 214 U/L
ANION GAP SERPL CALC-SCNC: 6 MMOL/L (ref 0–18)
AST SERPL-CCNC: 31 U/L (ref 15–37)
BILIRUB SERPL-MCNC: 0.6 MG/DL (ref 0.1–2)
BUN BLD-MCNC: 16 MG/DL (ref 7–18)
BUN/CREAT SERPL: 13.3 (ref 10–20)
CALCIUM BLD-MCNC: 9.4 MG/DL (ref 8.5–10.1)
CHLORIDE SERPL-SCNC: 105 MMOL/L (ref 98–112)
CO2 SERPL-SCNC: 30 MMOL/L (ref 21–32)
CREAT BLD-MCNC: 1.2 MG/DL
GLOBULIN PLAS-MCNC: 3 G/DL (ref 2.8–4.4)
GLUCOSE BLD-MCNC: 77 MG/DL (ref 70–99)
OSMOLALITY SERPL CALC.SUM OF ELEC: 292 MOSM/KG (ref 275–295)
POTASSIUM SERPL-SCNC: 3.7 MMOL/L (ref 3.5–5.1)
PROT SERPL-MCNC: 6.1 G/DL (ref 6.4–8.2)
SODIUM SERPL-SCNC: 141 MMOL/L (ref 136–145)

## 2022-01-05 PROCEDURE — 99239 HOSP IP/OBS DSCHRG MGMT >30: CPT | Performed by: HOSPITALIST

## 2022-01-05 NOTE — CM/SW NOTE
01/05/22 1400   Discharge disposition   Expected discharge disposition Home or Self   Home services after discharge None   Discharge transportation Private car     Pt discussed during nursing rounds. Pt is stable for dc today. MD dc order entered.   No h

## 2022-01-05 NOTE — PLAN OF CARE
Problem: Patient Centered Care  Goal: Patient preferences are identified and integrated in the patient's plan of care  Description: Interventions:  - What would you like us to know as we care for you? To go home without abdominal pain.  My son is my suppo Monitor bowel sounds and bowel activity. Call surgeon if getting bloated or have increasing nausea or vomiting.  - Monitor incision for any signs of infection.  - May shower, no tub bathing nor swimming in pool. - Pain management with oral medication.   - needed  - Establish a toileting routine/schedule  - Consider collaborating with pharmacy to review patient's medication profile  Outcome: Adequate for Discharge     Problem: RESPIRATORY - ADULT  Goal: Achieves optimal ventilation and oxygenation  Descripti

## 2022-01-05 NOTE — DISCHARGE SUMMARY
[facility] Discharge Summary   Patient ID:  Rakesh Mukherjee  [id number]  78year old  7/12/1955  Admit date: 12/30/2021  Discharge date: 1/5/2022  Primary Care Physician: No primary care provider on file.    Attending Physician: Namita Payan, surgery as scheduled       H/o HTN   -  norvasc      H/o HL   -  zetia      EXAM:   General: No acute distress. Skin: Warm hydrated  Psych: Calm and cooperative  Respiratory: Clear to auscultation bilaterally. No wheezes. No rhonchi.   Cardiovascular: S1, these medications      Instructions Prescription details   amLODIPine 5 MG Tabs  Commonly known as: NORVASC      Take 5 mg by mouth daily. Refills: 2     ascorbic acid 500 MG Tabs  Commonly known as: VITAMIN C      Take 500 mg by mouth daily.    Refills: outlined  Toshia ROBIN  Rounded with Dr. Sonny Moore  [phone]  1/5/2022    I attest to above and agree; seen and examined with ms dyer; present for entire visit.

## 2022-01-05 NOTE — PROGRESS NOTES
[facility] AND [facility]  GI Progress Note      Remy Killian Patient Status:  Inpatient    1955 MRN [id number]   Location [facility] 4W/SW/SE Attending Edith Velasquez, [address] [facility] Day # 5 PCP No primary care provider on file.           SUBJECTIVE

## 2022-01-05 NOTE — PLAN OF CARE
Pt is POD #2-3. Vital signs within normal limits. Denies pain. Alert and oriented x4. Tele in place, NSR-SB. On room air. Tolerating general diet. Voids freely. Lap sites clean, dry, and intact. Ambulates independently.  Heparin subcutaneous and SCDs for DV management  - Manage/alleviate anxiety  - Utilize distraction and/or relaxation techniques  - Monitor for opioid side effects  - Notify internist if intervention unsuccessful or patient reports new pain  - Anticipate increased pain with activity and pre-me indicated  - Manage/alleviate anxiety  - Monitor for signs/symptoms of CO2 retention  Outcome: Progressing

## 2022-01-06 VITALS
DIASTOLIC BLOOD PRESSURE: 58 MMHG | RESPIRATION RATE: 17 BRPM | SYSTOLIC BLOOD PRESSURE: 119 MMHG | OXYGEN SATURATION: 98 % | HEART RATE: 50 BPM | WEIGHT: 182.19 LBS | BODY MASS INDEX: 27.61 KG/M2 | TEMPERATURE: 98 F | HEIGHT: 68 IN

## 2022-01-10 ENCOUNTER — TELEPHONE (OUTPATIENT)
Dept: SURGERY | Facility: CLINIC | Age: 67
End: 2022-01-10

## 2022-01-10 ENCOUNTER — OFFICE VISIT (OUTPATIENT)
Dept: SURGERY | Facility: CLINIC | Age: 67
End: 2022-01-10
Payer: MEDICARE

## 2022-01-10 DIAGNOSIS — Z98.890 POST-OPERATIVE STATE: Primary | ICD-10-CM

## 2022-01-10 PROCEDURE — 1111F DSCHRG MED/CURRENT MED MERGE: CPT | Performed by: SURGERY

## 2022-01-10 PROCEDURE — 99024 POSTOP FOLLOW-UP VISIT: CPT | Performed by: SURGERY

## 2022-01-10 NOTE — PROGRESS NOTES
Postoperative Patient Follow-up      1/10/2022    MEL      Vinay Yusuf is a 78year old male post laparoscopic cholecystectomy for severe cholecystitis and pancreatitis. Postoperative EUS demonstrates no common bile duct stones.   Patient's LFTs were d

## 2022-01-10 NOTE — TELEPHONE ENCOUNTER
Pt called stating pt had surgery 1-2-22. Two of the bandages may have gotten wet or discharge from surgery site. Pt has a post op appointment scheduled 1-17-22. Call transferred to the nurse.

## 2022-06-05 ENCOUNTER — HEALTH MAINTENANCE LETTER (OUTPATIENT)
Age: 67
End: 2022-06-05

## 2022-10-15 ENCOUNTER — HEALTH MAINTENANCE LETTER (OUTPATIENT)
Age: 67
End: 2022-10-15

## 2023-06-11 ENCOUNTER — HEALTH MAINTENANCE LETTER (OUTPATIENT)
Age: 68
End: 2023-06-11

## 2024-11-04 ENCOUNTER — MEDICAL CORRESPONDENCE (OUTPATIENT)
Dept: HEALTH INFORMATION MANAGEMENT | Facility: CLINIC | Age: 69
End: 2024-11-04

## 2025-01-09 ENCOUNTER — TELEPHONE (OUTPATIENT)
Dept: DERMATOLOGY | Facility: CLINIC | Age: 70
End: 2025-01-09
Payer: MEDICARE

## 2025-01-09 NOTE — TELEPHONE ENCOUNTER
Called patient to schedule surgery with Dr. Larson    Date of Surgery: 02/26    Surgery type: Mohs    Consult scheduled: No    Has patient had mohs with us before? Yes    Additional comments: pt declined optional consult.       Felisa Hall on 1/9/2025 at 8:59 AM

## 2025-01-09 NOTE — TELEPHONE ENCOUNTER
Left patient a voicemail to schedule a consult & mohs for BCC of the forehead  with Dr. Larson or Dr. Tyler. Provided my direct phone number.    Felisa Hall on 1/9/2025 at 8:37 AM

## 2025-01-15 NOTE — TELEPHONE ENCOUNTER
FUTURE VISIT INFORMATION      FUTURE VISIT INFORMATION:  Date: 2.26.25  Time: 8:00  Location: [facility]  REFERRAL INFORMATION:  Referring provider:  Octavio  Referring providers clinic:  [facility]  Reason for visit/diagnosis  BCC of the forehead. return pt.      RECORDS REQUESTED FROM:       Clinic name Comments Records Status Imaging Status   [facility] 10.24.24  Path # [id number] CE CE

## 2025-02-26 ENCOUNTER — TELEPHONE (OUTPATIENT)
Dept: DERMATOLOGY | Facility: CLINIC | Age: 70
End: 2025-02-26

## 2025-02-26 ENCOUNTER — PRE VISIT (OUTPATIENT)
Dept: DERMATOLOGY | Facility: CLINIC | Age: 70
End: 2025-02-26

## 2025-02-26 NOTE — TELEPHONE ENCOUNTER
Follow up call completed following Mohs procedure with Dr. Larson.       Are you having pain? NO  Are you taking pain medication?  NA  Are you applying ice?  NO  Have you had any noticeable bleeding through the bandage? NO   Do you have any other concerns? NO       Please call [phone] if you have any questions or concerns during business hours. For concerns after hours, call the hospital  to reach the dermatology resident on call at [phone], option 4.     Dinorah JACINTO RN  Dermatology Surgery

## 2025-03-22 ENCOUNTER — HEALTH MAINTENANCE LETTER (OUTPATIENT)
Age: 70
End: 2025-03-22

## 2025-07-14 ENCOUNTER — OFFICE VISIT (OUTPATIENT)
Dept: DERMATOLOGY | Facility: CLINIC | Age: 70
End: 2025-07-14
Attending: DERMATOLOGY
Payer: MEDICARE

## 2025-07-14 DIAGNOSIS — C44.319 BASAL CELL CARCINOMA (BCC) OF FOREHEAD: Primary | ICD-10-CM

## 2025-07-14 NOTE — NURSING NOTE
Chief Complaint   Patient presents with    Scar Management     Scar eval, no concerns. Other lesions of concern on scalp     KRUPA Goetz-BSN  Dermatology Surgery

## 2025-07-14 NOTE — LETTER
7/14/2025       RE: Robert Abbott  [address]  [address]  [facility] [address]     Dear Colleague,    Thank you for referring your patient, Robert Abbott, to the [facility] DERMATOLOG[facility] SUR[facility]IC [place] [facility]. Please see a copy of my visit note below.    Dermatologic Surgery Post-Op Scar Check     CC: Scar Management (Scar eval, no concerns. Other lesions of concern on scalp)      Dermatology Problem List:  1. History of numerous non-melanoma skin cancers:  - nBCC - forehead, s/p bx 10/24/24, MMS 02/26/25  - nBCC - R frontal scalp s/p excision 11/17/21   - SCCis - L vertex scalp: bx 4/2021; s/p Mohs excision 5/3/21  - SCCis - R temple: bx 4/2021; s/p Mohs excision 5/3/21  - BCC - R temple: bx 5/2014; s/p Mohs excision; no evidence of recurrence  - SCC - R cheek: bx 5/2014; s/p Mohs excision; no evidence of recurrence  - BCC - L forehead: bx 7/2016; s/p MMS  2. H/o numerous AKs - scalp:  - Current tx: LN2  - Past tx: 5FU and calcipotriene BID on scalp Fall 2022  - Efudex + calcipotriene BID x4 days 11/2018 with excellent response  - s/p PDT 01/04/2022 to face and scalp  3. H/o dysplastic nevi  - Lentiginous melanocytic nevus with mild atypia, left arm, s/p bx 10/24/24  - Collision of compound melanocytic nevus with mild atypia and benign angioma, right back, s/p bx 10/24/24  - Also benign nevus s/p biopsy from left arm 5/2017  - dysplastic nevus, mild atypia - back: bx 2011       Subjective: Robert Abbott is a 70 year old male who presents today for scar evaluation after complex repair.   - Patient complains of brown papules across his forehead  - no other concerns today    Objective: An exam of the forehead was performed today   - well-healed scar on the forehead   - has half a dozen stuck on papules on the right and left forehead and a sandpaper like papule on the right temple    Assessment and Plan:     1. Post-surgical scar,  forehead, s/p Mohs 02/26/25  - Surgical site healed well.  - The patient should follow up with dermatologic surgery PRN, as well as continue with regular skin exams in general dermatology clinic.  2. Freeze 1 AK  3. Freeze 2 ISk      Patient was discussed with and evaluated by attending physician Dr. Larson.    Scribe Disclosure:   I, Meron Reece, am serving as a scribe; to document services personally performed by Kirt Larson MD -based on data collection and the provider's statements to me.       Attending Attestation  I attest that the Scribe recorded the interview and exam that I personally performed.  I have reviewed the note and edited it as necessary.    Kirt Larson M.D.  Professor  Director of Dermatologic Surgery  Department of Dermatology  [facility]            Again, thank you for allowing me to participate in the care of your patient.      Sincerely,    Kirt Larson MD

## 2025-07-14 NOTE — PROGRESS NOTES
Dermatologic Surgery Post-Op Scar Check     CC: Scar Management (Scar eval, no concerns. Other lesions of concern on scalp)      Dermatology Problem List:  1. History of numerous non-melanoma skin cancers:  - nBCC - forehead, s/p bx 10/24/24, MMS 02/26/25  - nBCC - R frontal scalp s/p excision 11/17/21   - SCCis - L vertex scalp: bx 4/2021; s/p Mohs excision 5/3/21  - SCCis - R temple: bx 4/2021; s/p Mohs excision 5/3/21  - BCC - R temple: bx 5/2014; s/p Mohs excision; no evidence of recurrence  - SCC - R cheek: bx 5/2014; s/p Mohs excision; no evidence of recurrence  - BCC - L forehead: bx 7/2016; s/p MMS  2. H/o numerous AKs - scalp:  - Current tx: LN2  - Past tx: 5FU and calcipotriene BID on scalp Fall 2022  - Efudex + calcipotriene BID x4 days 11/2018 with excellent response  - s/p PDT 01/04/2022 to face and scalp  3. H/o dysplastic nevi  - Lentiginous melanocytic nevus with mild atypia, left arm, s/p bx 10/24/24  - Collision of compound melanocytic nevus with mild atypia and benign angioma, right back, s/p bx 10/24/24  - Also benign nevus s/p biopsy from left arm 5/2017  - dysplastic nevus, mild atypia - back: bx 2011       Subjective: Robert Abbott is a 70 year old male who presents today for scar evaluation after complex repair.   - Patient complains of brown papules across his forehead  - no other concerns today    Objective: An exam of the forehead was performed today   - well-healed scar on the forehead   - has half a dozen stuck on papules on the right and left forehead and a sandpaper like papule on the right temple    Assessment and Plan:     1. Post-surgical scar, forehead, s/p Mohs 02/26/25  - Surgical site healed well.  - The patient should follow up with dermatologic surgery PRN, as well as continue with regular skin exams in general dermatology clinic.  2. Freeze 1 AK  3. Freeze 2 ISk      Patient was discussed with and evaluated by attending physician Dr. Larson.    Scribe Disclosure:   I,  Meron Reece, am serving as a scribe; to document services personally performed by Kirt Larson MD -based on data collection and the provider's statements to me.       Attending Attestation  I attest that the Scribe recorded the interview and exam that I personally performed.  I have reviewed the note and edited it as necessary.    Kirt Larson M.D.  Professor  Director of Dermatologic Surgery  Department of Dermatology  [facility]

## (undated) DEVICE — YANKAUER SUCTION INSTRUMENT NO CONTROL VENT, BULB TIP, CLEAR: Brand: YANKAUER

## (undated) DEVICE — TROCARS: Brand: KII® BLUNT TIP ACCESS SYSTEM

## (undated) DEVICE — Device

## (undated) DEVICE — SUTURE VICRYL 0 UR-6

## (undated) DEVICE — MEDI-VAC NON-CONDUCTIVE SUCTION TUBING 6MM X 1.8M (6FT.) L: Brand: CARDINAL HEALTH

## (undated) DEVICE — GAMMEX® PI HYBRID SIZE 7, STERILE POWDER-FREE SURGICAL GLOVE, POLYISOPRENE AND NEOPRENE BLEND: Brand: GAMMEX

## (undated) DEVICE — LINE MNTR ADLT SET O2 INTMD

## (undated) DEVICE — SOL  .9 1000ML BAG

## (undated) DEVICE — KIT ENDO ORCAPOD 160/180/190

## (undated) DEVICE — CONMED SCOPE SAVER BITE BLOCK, 20X27 MM: Brand: SCOPE SAVER

## (undated) DEVICE — TISSUE RETRIEVAL SYSTEM: Brand: INZII RETRIEVAL SYSTEM

## (undated) DEVICE — [HIGH FLOW INSUFFLATOR,  DO NOT USE IF PACKAGE IS DAMAGED,  KEEP DRY,  KEEP AWAY FROM SUNLIGHT,  PROTECT FROM HEAT AND RADIOACTIVE SOURCES.]: Brand: PNEUMOSURE

## (undated) DEVICE — CANNULA NASAL 02/C02 ADULT

## (undated) DEVICE — SUTURE VICRYL 0

## (undated) DEVICE — SOL  .9 1000ML BTL

## (undated) DEVICE — DISPOSABLE SUCTION/IRRIGATOR TUBE SET: Brand: AHTO

## (undated) DEVICE — KIT CLEAN ENDOKIT 1.1OZ GOWNX2

## (undated) DEVICE — TROCAR: Brand: KII® SLEEVE

## (undated) DEVICE — MEDI-VAC NON-CONDUCTIVE SUCTION TUBING: Brand: CARDINAL HEALTH

## (undated) DEVICE — GAUZE SPONGES,8 PLY: Brand: CURITY

## (undated) DEVICE — LAP CHOLE: Brand: MEDLINE INDUSTRIES, INC.

## (undated) DEVICE — Device: Brand: JELCO

## (undated) DEVICE — 9534HP TRANSPARENT DRSG W/FRAME: Brand: 3M™ TEGADERM™

## (undated) DEVICE — Device: Brand: DEFENDO AIR/WATER/SUCTION AND BIOPSY VALVE

## (undated) DEVICE — LIGACLIP 10-M/L, 10MM ENDOSCOPIC ROTATING MULTIPLE CLIP APPLIERS: Brand: LIGACLIP

## (undated) DEVICE — SUTURE MONOCRYL 3-0 Y497G

## (undated) DEVICE — SUTURE PASSOR WITH GUIDE

## (undated) DEVICE — TROCAR: Brand: KII SHIELDED BLADED ACCESS SYSTEM

## (undated) NOTE — LETTER
[address] [place] [address]      Authorization for Surgical Operation and Procedure     Date:___________                                                                                                         Time:_______ potential risks that can occur: fever and allergic reactions, hemolytic reactions, transmission of diseases such as Hepatitis, AIDS and Cytomegalovirus (CMV) and fluid overload.   In the event that I wish to have an autologous transfusion of my own blood, o will determine when the applicable recovery period ends for purposes of reinstating the DNAR order.   10. Patients having a sterilization procedure: I understand that if the procedure is successful the results will be permanent and it will therefore be impo _______________________________________________________________ _____________________________  Rishi Orf of Physician)                                                                                         (Date)                                   (Ti